# Patient Record
Sex: FEMALE | Race: ASIAN | NOT HISPANIC OR LATINO | URBAN - METROPOLITAN AREA
[De-identification: names, ages, dates, MRNs, and addresses within clinical notes are randomized per-mention and may not be internally consistent; named-entity substitution may affect disease eponyms.]

---

## 2019-03-28 ENCOUNTER — EMERGENCY (EMERGENCY)
Facility: HOSPITAL | Age: 84
LOS: 1 days | Discharge: HOME | End: 2019-03-28
Attending: EMERGENCY MEDICINE | Admitting: EMERGENCY MEDICINE
Payer: MEDICAID

## 2019-03-28 VITALS — SYSTOLIC BLOOD PRESSURE: 141 MMHG | RESPIRATION RATE: 16 BRPM | DIASTOLIC BLOOD PRESSURE: 104 MMHG

## 2019-03-28 VITALS
OXYGEN SATURATION: 100 % | HEART RATE: 89 BPM | SYSTOLIC BLOOD PRESSURE: 125 MMHG | TEMPERATURE: 98 F | DIASTOLIC BLOOD PRESSURE: 77 MMHG | RESPIRATION RATE: 18 BRPM

## 2019-03-28 LAB
ALBUMIN SERPL ELPH-MCNC: 4.2 G/DL — SIGNIFICANT CHANGE UP (ref 3.3–5)
ALP SERPL-CCNC: 85 U/L — SIGNIFICANT CHANGE UP (ref 40–120)
ALT FLD-CCNC: 12 U/L — SIGNIFICANT CHANGE UP (ref 4–33)
ANION GAP SERPL CALC-SCNC: 14 MMO/L — SIGNIFICANT CHANGE UP (ref 7–14)
APPEARANCE UR: CLEAR — SIGNIFICANT CHANGE UP
AST SERPL-CCNC: 25 U/L — SIGNIFICANT CHANGE UP (ref 4–32)
B PERT DNA SPEC QL NAA+PROBE: NOT DETECTED — SIGNIFICANT CHANGE UP
BACTERIA # UR AUTO: HIGH
BASOPHILS # BLD AUTO: 0.05 K/UL — SIGNIFICANT CHANGE UP (ref 0–0.2)
BASOPHILS NFR BLD AUTO: 0.4 % — SIGNIFICANT CHANGE UP (ref 0–2)
BILIRUB SERPL-MCNC: 0.2 MG/DL — SIGNIFICANT CHANGE UP (ref 0.2–1.2)
BILIRUB UR-MCNC: NEGATIVE — SIGNIFICANT CHANGE UP
BLOOD UR QL VISUAL: NEGATIVE — SIGNIFICANT CHANGE UP
BUN SERPL-MCNC: 39 MG/DL — HIGH (ref 7–23)
C PNEUM DNA SPEC QL NAA+PROBE: NOT DETECTED — SIGNIFICANT CHANGE UP
CALCIUM SERPL-MCNC: 9.3 MG/DL — SIGNIFICANT CHANGE UP (ref 8.4–10.5)
CHLORIDE SERPL-SCNC: 105 MMOL/L — SIGNIFICANT CHANGE UP (ref 98–107)
CO2 SERPL-SCNC: 23 MMOL/L — SIGNIFICANT CHANGE UP (ref 22–31)
COLOR SPEC: YELLOW — SIGNIFICANT CHANGE UP
CREAT SERPL-MCNC: 1.33 MG/DL — HIGH (ref 0.5–1.3)
EOSINOPHIL # BLD AUTO: 0.18 K/UL — SIGNIFICANT CHANGE UP (ref 0–0.5)
EOSINOPHIL NFR BLD AUTO: 1.3 % — SIGNIFICANT CHANGE UP (ref 0–6)
FLUAV H1 2009 PAND RNA SPEC QL NAA+PROBE: NOT DETECTED — SIGNIFICANT CHANGE UP
FLUAV H1 RNA SPEC QL NAA+PROBE: NOT DETECTED — SIGNIFICANT CHANGE UP
FLUAV H3 RNA SPEC QL NAA+PROBE: NOT DETECTED — SIGNIFICANT CHANGE UP
FLUAV SUBTYP SPEC NAA+PROBE: NOT DETECTED — SIGNIFICANT CHANGE UP
FLUBV RNA SPEC QL NAA+PROBE: NOT DETECTED — SIGNIFICANT CHANGE UP
GLUCOSE SERPL-MCNC: 92 MG/DL — SIGNIFICANT CHANGE UP (ref 70–99)
GLUCOSE UR-MCNC: NEGATIVE — SIGNIFICANT CHANGE UP
HADV DNA SPEC QL NAA+PROBE: NOT DETECTED — SIGNIFICANT CHANGE UP
HCOV PNL SPEC NAA+PROBE: SIGNIFICANT CHANGE UP
HCT VFR BLD CALC: 36.6 % — SIGNIFICANT CHANGE UP (ref 34.5–45)
HGB BLD-MCNC: 11.3 G/DL — LOW (ref 11.5–15.5)
HMPV RNA SPEC QL NAA+PROBE: NOT DETECTED — SIGNIFICANT CHANGE UP
HPIV1 RNA SPEC QL NAA+PROBE: NOT DETECTED — SIGNIFICANT CHANGE UP
HPIV2 RNA SPEC QL NAA+PROBE: NOT DETECTED — SIGNIFICANT CHANGE UP
HPIV3 RNA SPEC QL NAA+PROBE: NOT DETECTED — SIGNIFICANT CHANGE UP
HPIV4 RNA SPEC QL NAA+PROBE: NOT DETECTED — SIGNIFICANT CHANGE UP
HYALINE CASTS # UR AUTO: NEGATIVE — SIGNIFICANT CHANGE UP
IMM GRANULOCYTES NFR BLD AUTO: 0.3 % — SIGNIFICANT CHANGE UP (ref 0–1.5)
KETONES UR-MCNC: NEGATIVE — SIGNIFICANT CHANGE UP
LEUKOCYTE ESTERASE UR-ACNC: SIGNIFICANT CHANGE UP
LYMPHOCYTES # BLD AUTO: 37.2 % — SIGNIFICANT CHANGE UP (ref 13–44)
LYMPHOCYTES # BLD AUTO: 5.02 K/UL — HIGH (ref 1–3.3)
MAGNESIUM SERPL-MCNC: 2.4 MG/DL — SIGNIFICANT CHANGE UP (ref 1.6–2.6)
MCHC RBC-ENTMCNC: 28.2 PG — SIGNIFICANT CHANGE UP (ref 27–34)
MCHC RBC-ENTMCNC: 30.9 % — LOW (ref 32–36)
MCV RBC AUTO: 91.3 FL — SIGNIFICANT CHANGE UP (ref 80–100)
MONOCYTES # BLD AUTO: 1.15 K/UL — HIGH (ref 0–0.9)
MONOCYTES NFR BLD AUTO: 8.5 % — SIGNIFICANT CHANGE UP (ref 2–14)
NEUTROPHILS # BLD AUTO: 7.05 K/UL — SIGNIFICANT CHANGE UP (ref 1.8–7.4)
NEUTROPHILS NFR BLD AUTO: 52.3 % — SIGNIFICANT CHANGE UP (ref 43–77)
NITRITE UR-MCNC: POSITIVE — HIGH
NRBC # FLD: 0 K/UL — SIGNIFICANT CHANGE UP (ref 0–0)
PH UR: 6 — SIGNIFICANT CHANGE UP (ref 5–8)
PHOSPHATE SERPL-MCNC: 4.2 MG/DL — SIGNIFICANT CHANGE UP (ref 2.5–4.5)
PLATELET # BLD AUTO: 345 K/UL — SIGNIFICANT CHANGE UP (ref 150–400)
PMV BLD: 12 FL — SIGNIFICANT CHANGE UP (ref 7–13)
POTASSIUM SERPL-MCNC: 5.4 MMOL/L — HIGH (ref 3.5–5.3)
POTASSIUM SERPL-SCNC: 5.4 MMOL/L — HIGH (ref 3.5–5.3)
PROT SERPL-MCNC: 8.7 G/DL — HIGH (ref 6–8.3)
PROT UR-MCNC: 20 — SIGNIFICANT CHANGE UP
RBC # BLD: 4.01 M/UL — SIGNIFICANT CHANGE UP (ref 3.8–5.2)
RBC # FLD: 14.1 % — SIGNIFICANT CHANGE UP (ref 10.3–14.5)
RBC CASTS # UR COMP ASSIST: SIGNIFICANT CHANGE UP (ref 0–?)
RSV RNA SPEC QL NAA+PROBE: NOT DETECTED — SIGNIFICANT CHANGE UP
RV+EV RNA SPEC QL NAA+PROBE: NOT DETECTED — SIGNIFICANT CHANGE UP
SODIUM SERPL-SCNC: 142 MMOL/L — SIGNIFICANT CHANGE UP (ref 135–145)
SP GR SPEC: 1.02 — SIGNIFICANT CHANGE UP (ref 1–1.04)
SQUAMOUS # UR AUTO: SIGNIFICANT CHANGE UP
UROBILINOGEN FLD QL: NORMAL — SIGNIFICANT CHANGE UP
WBC # BLD: 13.49 K/UL — HIGH (ref 3.8–10.5)
WBC # FLD AUTO: 13.49 K/UL — HIGH (ref 3.8–10.5)
WBC UR QL: HIGH (ref 0–?)

## 2019-03-28 PROCEDURE — 71045 X-RAY EXAM CHEST 1 VIEW: CPT | Mod: 26

## 2019-03-28 PROCEDURE — 99285 EMERGENCY DEPT VISIT HI MDM: CPT | Mod: 25

## 2019-03-28 RX ORDER — HALOPERIDOL DECANOATE 100 MG/ML
2.5 INJECTION INTRAMUSCULAR ONCE
Qty: 0 | Refills: 0 | Status: COMPLETED | OUTPATIENT
Start: 2019-03-28 | End: 2019-03-28

## 2019-03-28 RX ORDER — CEFTRIAXONE 500 MG/1
1000 INJECTION, POWDER, FOR SOLUTION INTRAMUSCULAR; INTRAVENOUS ONCE
Qty: 0 | Refills: 0 | Status: COMPLETED | OUTPATIENT
Start: 2019-03-28 | End: 2019-03-28

## 2019-03-28 RX ORDER — CEPHALEXIN 500 MG
500 CAPSULE ORAL ONCE
Qty: 0 | Refills: 0 | Status: COMPLETED | OUTPATIENT
Start: 2019-03-28 | End: 2019-03-28

## 2019-03-28 RX ADMIN — HALOPERIDOL DECANOATE 2.5 MILLIGRAM(S): 100 INJECTION INTRAMUSCULAR at 07:19

## 2019-03-28 RX ADMIN — Medication 1 MILLIGRAM(S): at 10:40

## 2019-03-28 RX ADMIN — CEFTRIAXONE 1000 MILLIGRAM(S): 500 INJECTION, POWDER, FOR SOLUTION INTRAMUSCULAR; INTRAVENOUS at 14:59

## 2019-03-28 NOTE — ED ADULT NURSE REASSESSMENT NOTE - NS ED NURSE REASSESS COMMENT FT1
1000: Pt less agitated if no interventions are being performed. Sitting up in bed. Becomes agitated when staff comes near, not verbally redirectable despite multiple attempts by multiple staff..    1045: Pt medicated as per MD.     11:15: Pt mildly agitated during interventions, straight cath for urine as per MD orders. RVP sent. Will continue to monitor.

## 2019-03-28 NOTE — ED PROVIDER NOTE - PROGRESS NOTE DETAILS
Elizabeth Goldberger PGY-2: attyuliya Ortega called nursing home, awaiting callback from attg Spoke to Dr. Gomez Gonsalez at the pt's nursing home who states that there has been no change in the pt's mental status lately (baseline is agitated & not oriented). She was only sent in for elevated K, which is 5.4 in the ED--outside sample most likely hemolyzed. Dr. Gonsalez is ok with the pt coming back once urinalysis is checked. Pt has UTI. Spoke to Dr. Gonsalez, the pt's nursing home doctor. Will give her 500 keflex here and Dr. Gonsalez will complete the rest of the course after discharge. Just prior to ambulance , the pt crawled out of bed onto her hands an knees. Was not a fall. No head trauma. Pt reporting no pain. No external signs of trauma or bony tenderness in any extremities.

## 2019-03-28 NOTE — ED ADULT NURSE REASSESSMENT NOTE - NS ED NURSE REASSESS COMMENT FT1
EKG completed by PCA, linen change provided. Pt made more comfortable, pillows provided. Skin on back is intact. Small spots of redness observed to R arm. Family now at bedside. Currently NSR on cardiac monitor. Will continue to monitor.

## 2019-03-28 NOTE — ED PROVIDER NOTE - CLINICAL SUMMARY MEDICAL DECISION MAKING FREE TEXT BOX
86f w hx dementia, bipolar disorder, hypothyroidism [facility] incl hypernatremia, hyperkalemia and elevated crt. Pt currently awake but agitated and non-verbal, similar to baseline per paperwork. Will check rpt labs, hydrate, d/w NH reassess

## 2019-03-28 NOTE — PROVIDER CONTACT NOTE (OTHER) - ASSESSMENT
pt stable to return to [facility], [place], operated by [facility].   [facility] called and transportation set up with senior [facility].  trip#[id number], conf#

## 2019-03-28 NOTE — ED PROVIDER NOTE - ATTENDING CONTRIBUTION TO CARE
pt presenting from NH with reports of increased agitation and an elevated K and Na.  Pt is non verbal at baseline and is unable to provide any history    Gen: Chronically ill appearing in NAD  Head: NC/AT  Neck: trachea midline  Resp:  No distress CTAB  CV: RRR  Ext: no deformities  Neuro:  Alert appears non focal  Skin:  Warm and dry as visualized    Pt from NH with reported electrolyte abnormalities along with AMS.  Will recheck K as well as do infectious eval with CxR and UA.  Depending on results will discuss with NH MD and disposition appropriately

## 2019-03-28 NOTE — ED PROVIDER NOTE - NEUROLOGICAL, MLM
Ventral hernia without obstruction or gangrene  04/30/2018    Active  Fernanda Concepcion Alert, no focal deficits

## 2019-03-28 NOTE — ED ADULT NURSE REASSESSMENT NOTE - NS ED NURSE REASSESS COMMENT FT1
break cvg rn: patient resting in stretcher, combative with staff for all nursing interventions. medicated as per orders, on attempt to turn and reposition patient, patient began swinging/scrathing/attempting to bite staff members. able to be redirected. will ctm and maintain safety, pt is pending transport back to nursing home.
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
## 2019-03-28 NOTE — ED ADULT NURSE NOTE - OBJECTIVE STATEMENT
pt received in room 18, sent in from nursing home for high potassium & agitation. Pt is unwilling to cooperate with exam, attempting to hit, bite, kick and throw objects at staff. According to paperwork, pt was acting out at nursing home when staff attempted to give her medications to lower her potassium. 20G IV placed to R forearm. Labs drawn & sent. Will continue to monitor.

## 2019-03-28 NOTE — ED ADULT NURSE REASSESSMENT NOTE - NS ED NURSE REASSESS COMMENT FT1
1822: [facility] at bedside for  to nursing home. Pt in NAD. sitting up in bed, awake. Family at bedside prior to transfer. Family notified of pts incident.
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
## 2019-03-28 NOTE — ED PROVIDER NOTE - OBJECTIVE STATEMENT
86f w hx dementia, bipolar disorder, hypothyroidism sent in from [facility] for abnl labs - Na 149, K 6, crt 1.4. Pt herself currently awake and alert but not speaking. Hx per NH paperwork states attempted to give pt Kayexalate and IV hypotonic saline; some Kayexalate given but only sm amt, and pt pulled out iv immediately, so decision was made to sent to ED.

## 2019-03-28 NOTE — ED ADULT NURSE NOTE - NSIMPLEMENTINTERV_GEN_ALL_ED
Implemented All Fall Risk Interventions:  [place] to call system. Call bell, personal items and telephone within reach. Instruct patient to call for assistance. Room bathroom lighting operational. Non-slip footwear when patient is off stretcher. Physically safe environment: no spills, clutter or unnecessary equipment. Stretcher in lowest position, wheels locked, appropriate side rails in place. Provide visual cue, wrist band, yellow gown, etc. Monitor gait and stability. Monitor for mental status changes and reorient to person, place, and time. Review medications for side effects contributing to fall risk. Reinforce activity limits and safety measures with patient and family.

## 2019-03-28 NOTE — ED ADULT NURSE REASSESSMENT NOTE - NS ED NURSE REASSESS COMMENT FT1
1814: Pt trying to climb out of bed, witnessed coming down to floor on hands and knees. Unable to reach pt in time. Did not hit head. Awake/alert at all times. MD immediately at bedside to perform evaluation. No visible injuries observed.     Assessed by MD Ugalde at bedside. ED att Antionette made aware/notified.

## 2019-03-29 LAB — SPECIMEN SOURCE: SIGNIFICANT CHANGE UP

## 2019-03-30 LAB
-  AMIKACIN: SIGNIFICANT CHANGE UP
-  AMPICILLIN/SULBACTAM: SIGNIFICANT CHANGE UP
-  AMPICILLIN: SIGNIFICANT CHANGE UP
-  AZTREONAM: SIGNIFICANT CHANGE UP
-  CEFAZOLIN: SIGNIFICANT CHANGE UP
-  CEFEPIME: SIGNIFICANT CHANGE UP
-  CEFOXITIN: SIGNIFICANT CHANGE UP
-  CEFTAZIDIME: SIGNIFICANT CHANGE UP
-  CEFTRIAXONE: SIGNIFICANT CHANGE UP
-  CIPROFLOXACIN: SIGNIFICANT CHANGE UP
-  ERTAPENEM: SIGNIFICANT CHANGE UP
-  GENTAMICIN: SIGNIFICANT CHANGE UP
-  IMIPENEM: SIGNIFICANT CHANGE UP
-  LEVOFLOXACIN: SIGNIFICANT CHANGE UP
-  MEROPENEM: SIGNIFICANT CHANGE UP
-  NITROFURANTOIN: SIGNIFICANT CHANGE UP
-  PIPERACILLIN/TAZOBACTAM: SIGNIFICANT CHANGE UP
-  TIGECYCLINE: SIGNIFICANT CHANGE UP
-  TOBRAMYCIN: SIGNIFICANT CHANGE UP
-  TRIMETHOPRIM/SULFAMETHOXAZOLE: SIGNIFICANT CHANGE UP
BACTERIA UR CULT: SIGNIFICANT CHANGE UP
METHOD TYPE: SIGNIFICANT CHANGE UP
ORGANISM # SPEC MICROSCOPIC CNT: SIGNIFICANT CHANGE UP
ORGANISM # SPEC MICROSCOPIC CNT: SIGNIFICANT CHANGE UP
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
## 2022-05-18 ENCOUNTER — EMERGENCY (EMERGENCY)
Facility: HOSPITAL | Age: 87
LOS: 1 days | Discharge: HOME | End: 2022-05-18
Attending: EMERGENCY MEDICINE | Admitting: EMERGENCY MEDICINE
Payer: MEDICAID

## 2022-05-18 VITALS
OXYGEN SATURATION: 98 % | RESPIRATION RATE: 16 BRPM | HEART RATE: 98 BPM | TEMPERATURE: 99 F | SYSTOLIC BLOOD PRESSURE: 141 MMHG | DIASTOLIC BLOOD PRESSURE: 89 MMHG

## 2022-05-18 VITALS
TEMPERATURE: 98 F | RESPIRATION RATE: 18 BRPM | OXYGEN SATURATION: 97 % | HEART RATE: 99 BPM | SYSTOLIC BLOOD PRESSURE: 144 MMHG | DIASTOLIC BLOOD PRESSURE: 82 MMHG

## 2022-05-18 PROBLEM — F03.90 UNSPECIFIED DEMENTIA, UNSPECIFIED SEVERITY, WITHOUT BEHAVIORAL DISTURBANCE, PSYCHOTIC DISTURBANCE, MOOD DISTURBANCE, AND ANXIETY: Chronic | Status: ACTIVE | Noted: 2019-03-28

## 2022-05-18 PROBLEM — F03.90 UNSPECIFIED DEMENTIA WITHOUT BEHAVIORAL DISTURBANCE: Chronic | Status: ACTIVE | Noted: 2019-03-28

## 2022-05-18 PROCEDURE — 99284 EMERGENCY DEPT VISIT MOD MDM: CPT

## 2022-05-18 NOTE — ED PROVIDER NOTE - PATIENT PORTAL LINK FT
You can access the FollowMyHealth Patient Portal offered by [facility] by registering at the following website: [email or web address]. By joining [employer]’s FollowMyHealth portal, you will also be able to view your health information using other applications (apps) compatible with our system.

## 2022-05-18 NOTE — ED PROVIDER NOTE - PROGRESS NOTE DETAILS
AVI BULL: spoke with pt's daughter (Guardian) and  Dr. Cooper on phone, regarding pt's left hip swelling, atraumatic. discussed risks & benefits of obtain Xray hip. Informed that patient is not surgical candidate even if there's fracture; and risks of using sedation. Attending also spoke with pt's daughter & daughter's  regarding patient's condition, exam finding, and risks & benefits of Xray and sedation.

## 2022-05-18 NOTE — ED ADULT TRIAGE NOTE - CHIEF COMPLAINT QUOTE
Pt BIBA from [facility] for swelling in Left hip. pt is sitting cross-legged, does not appear to be in pain, is non-verbal at baseline

## 2022-05-18 NOTE — ED PROVIDER NOTE - CLINICAL SUMMARY MEDICAL DECISION MAKING FREE TEXT BOX
88 yo F with PMH of dementia, hypothyroid, depression, osteoarthritis, [facility]o left hip swelling today. Pt non-verbal. No hx of injury/trauma. There is no fever. low suspicion for fracture/dislocation. low suspicion for infectious etiology. Discussed with Dr. Shearer, agrees that patient is bedbound, even if patient has fracture, pt is non-surgical candidate and not candidate for X-ray (pt doesn't follow command, in fetal position). Plan: discussed with family regarding risk & benefits of obtain Xray for further evaluation.

## 2022-05-18 NOTE — PROVIDER CONTACT NOTE (OTHER) - ASSESSMENT
Arranged [facility] [phone]. Trip# [id number]. P/U 8 PM. Pt returning back to [facility] Auth#  Inv# [id number]

## 2022-05-18 NOTE — ED PROVIDER NOTE - NS ED ATTENDING STATEMENT MOD
This was a shared visit with the ERASMO. I reviewed and verified the documentation and independently performed the documented:

## 2022-05-18 NOTE — ED PROVIDER NOTE - EXTREMITY EXAM
patient in fetal position, flexed at hip & knees. Hip: no erythema, no edema, no ecchymosis, no bony tenderness, no swelling, skin intact. Left lower leg: dark discoloration (chronic skin changes); patient is not in pain during exam.

## 2022-05-18 NOTE — ED PROVIDER NOTE - ATTENDING APP SHARED VISIT CONTRIBUTION OF CARE
Attending Statement: I have reviewed and agree with all pertinent clinical information, including history and physical exam and agree with treatment plan of the PA, except as noted.  88 yo F with PMH of dementia, hypothyroid, depression, osteoarthritis BIBEMS from NH for left hip swelling today per NH forms. pt unable to give any hx. pt initially sitting up with both leg flexed, able to undress her with PA Aman, pt laying down with both hips flexed, resting comfortably no moaning or groaning. no grimace or yelling when move her, able to turn pt to left/right for exam. no distress. Doesn't appear in pain. Looks around, non verbal. no sign of head/facial trauma. no retractions. poor inspiratory effort w no WOB. soft nondistended abdomen, no grimace to palpation. no leg swelling of lower ext or bl hips. no erythema no warmth to touch. no rash. superficial scratch on the left thigh, no bleeding. no vesicles. no leg edema. PVD bl lower ext   DW Dr Barton, PMD, and family : sister and :  latosha no signs for septic joint: doesn't appear to be in pain. pt is not ambulatory. Risks/benefits dw pt family and PMD, dc back to NH. no labs/xr indicated.

## 2022-05-18 NOTE — ED PROVIDER NOTE - OBJECTIVE STATEMENT
89 yo F with PMH of dementia, hypothyroid, depression, osteoarthritis, sent from [facility] to ED c/o left hip swelling today. Pt non-verbal at baseline. Spoke with Dr. Shearer, states patient has swelling to left hip, but no fall, no injury/trauma, been waiting 48 hrs for X-ray to evaluate fracture. Admits patient is bedbound, non-ambulatory.

## 2022-05-18 NOTE — ED ADULT NURSE NOTE - CHIEF COMPLAINT QUOTE
Pt BIBA from [facility] for swelling in Left hip. pt is sitting cross-legged, does not appear to be in pain, is non-verbal at baseline

## 2022-10-10 NOTE — ED PROVIDER NOTE - CPE EDP RESP NORM
normal... Cephalexin Pregnancy And Lactation Text: This medication is Pregnancy Category B and considered safe during pregnancy.  It is also excreted in breast milk but can be used safely for shorter doses.

## 2022-11-10 ENCOUNTER — EMERGENCY (EMERGENCY)
Facility: HOSPITAL | Age: 87
LOS: 1 days | Discharge: HOME | End: 2022-11-10
Attending: EMERGENCY MEDICINE | Admitting: EMERGENCY MEDICINE

## 2022-11-10 VITALS
OXYGEN SATURATION: 98 % | HEART RATE: 113 BPM | SYSTOLIC BLOOD PRESSURE: 139 MMHG | RESPIRATION RATE: 18 BRPM | DIASTOLIC BLOOD PRESSURE: 86 MMHG | TEMPERATURE: 98 F

## 2022-11-10 PROBLEM — M19.90 UNSPECIFIED OSTEOARTHRITIS, UNSPECIFIED SITE: Chronic | Status: ACTIVE | Noted: 2022-05-18

## 2022-11-10 PROBLEM — F32.9 MAJOR DEPRESSIVE DISORDER, SINGLE EPISODE, UNSPECIFIED: Chronic | Status: ACTIVE | Noted: 2022-05-18

## 2022-11-10 PROBLEM — E03.9 HYPOTHYROIDISM, UNSPECIFIED: Chronic | Status: ACTIVE | Noted: 2022-05-18

## 2022-11-10 PROCEDURE — 72125 CT NECK SPINE W/O DYE: CPT | Mod: 26,MG

## 2022-11-10 PROCEDURE — 99285 EMERGENCY DEPT VISIT HI MDM: CPT

## 2022-11-10 PROCEDURE — G1004: CPT

## 2022-11-10 PROCEDURE — 70450 CT HEAD/BRAIN W/O DYE: CPT | Mod: 26,MA

## 2022-11-10 PROCEDURE — 72170 X-RAY EXAM OF PELVIS: CPT | Mod: 26

## 2022-11-10 RX ADMIN — Medication 1 MILLIGRAM(S): at 20:39

## 2022-11-10 NOTE — ED PROVIDER NOTE - OBJECTIVE STATEMENT
89 yof w/ dementia, hypothyroidiism, DNR DNI from [facility] w/ bump of back of head. Sent in for eval. Story states from NH that she was found w/ bump on head, not found on floor, unclear if LOC or hit head at all. On ASA at home. No fevers noted in chart. No other c/c. Baseline is aox1, to self

## 2022-11-10 NOTE — ED ADULT NURSE NOTE - OBJECTIVE STATEMENT
Pt received rm 12, by EMS sent from [facility], [place], operated by [facility] for a bump/brusie found on the top of head. pmhx dementia, major depression. Unable to asses orientation status, pt minimally verbal. Pt is awake and alert in bed. Pt not following commands for neuro/physically assessment.  Pt states she "bumped her head." Pt is poor historian on events that happened. Pt uses nonverbal indicators of pain, nodding of head, and pointing to where it hurts. Breathing even, unlabored. Pulses equal bilaterally, cap refill < 3secs. PERRLA. Sinus tach on monitor, MD aware. Vitals are stable, pt afebrile. Pending CT.

## 2022-11-10 NOTE — ED PROVIDER NOTE - NSFOLLOWUPINSTRUCTIONS_ED_ALL_ED_FT
--take tylenol or motrin as needed for pain. Take tylenol 1000mg every 6 hours alternating with motrin 600 mg every 6 hours (take tylenol or motrin then three hours later take the other then three hours later take the first).  --today, the imaging tests we did include ct head and ct c spine, xray pelvis. results significant for no fracture. we have included these test results in your paperwork. please take them to your follow-up appointment  --given that you were in the ED today, we recommend a followup visit with your general doctor (primary care doctor) within 7 days.   --your diagnosis is: traumatic hematoma of the head, initial encounter.   --return to the ED if your current symptoms worsen, if confused, if vision changes, if vomiting.

## 2022-11-10 NOTE — ED PROVIDER NOTE - PROGRESS NOTE DETAILS
Eder Venegas, PGY-3- Spoke with Zoe Ellsworth, per Zoe Ellsworth she did not have a fall and was found to have hematoma on scalp. Pt is confused at baseline. Pt sits in bed daily. Pt sitting comfortable in bed. Waiting on 2nd try for CT; first CT - pt agitated, will attempt w/ ativan premedicated Alyssa Fuentes MD, PGY-3: pt not tolerating chest xray given agitation. pt already given multiple doses of ativan for agitation/to tolerate imaging. given low concern for PTX or rib fracture, pt satting well on RA, not tachypneic, will dc w/o imaging.

## 2022-11-10 NOTE — ED ADULT NURSE NOTE - CHIEF COMPLAINT QUOTE
p/t with hx dementia sent from NH for eval, new onset of bruise to lt arm and hematoma to lt upper head, p/t non verbal @ baseline, awake and calm  @ present. no blood thinner use

## 2022-11-10 NOTE — ED PROVIDER NOTE - PHYSICAL EXAMINATION
Exam:   General, sitting in bed. Per paperwork baseline is AOx1, pt awake tracking me asking for water.    Skin: Skin not noted to be bruised on arm or limbs. Appreciated dark discoloration of skin on limbs and torso, rough to touch.    HEENT: No rhinorhea, no conjuctival injection,, no battles sign noted, no rhinorehea noted, no perorbital ecchymosis. Crown of head noted to have 3 cm by 3 cm raised area, no brusing noted. Some flucuance noted    CHEST: RRR, no murmurs   Pulm: No crackles or diminshed BS b/l. No ttp on anterior and lateral ribcage. No ttp when palp limbs  GI: no ttp in any quadrant, no noted periflank hemmorhage noted Exam:   General, sitting in bed. Per paperwork baseline is AOx1, pt awake tracking me asking for water.    Skin: Skin not noted to be bruised on arm or limbs. Appreciated dark discoloration of skin on limbs and torso, rough to touch.    HEENT: No rhinorhea, no conjuctival injection,, no battles sign noted, no rhinorehea noted, no perorbital ecchymosis. Crown of head noted to have 3 cm by 3 cm raised area, no brusing noted. Some flucuance noted    CHEST: RRR, no murmurs   Pulm: No crackles or diminshed BS b/l. No ttp on anterior and lateral ribcage. No ttp when palp limbs  GI: no ttp in any quadrant, no noted periflank hemmorhage noted    PSYCH:  Calm cooperative  NEURO:  Moving all ext

## 2022-11-10 NOTE — ED PROVIDER NOTE - ATTENDING CONTRIBUTION TO CARE
DR. THOMAS, ATTENDING MD-  I performed a face to face bedside interview with the patient regarding history of present illness, review of symptoms and past medical history. I completed an independent physical exam.  I have discussed the patient's plan of care with the resident.   Documentation as above in the note.    89-year-old female history of dementia here for evaluation of hematoma to occiput.  No witnessed fall.  Not on blood thinners.  Patient at baseline mental status per facility.  Obtain CT head C-spine chest x-ray pelvis x-ray likely discharge with PCP follow-up as outpatient.

## 2022-11-10 NOTE — ED PROVIDER NOTE - CLINICAL SUMMARY MEDICAL DECISION MAKING FREE TEXT BOX
a/p: 89 yof dementia, hypothyroidism, DNR DNI p/w bump on back of head on ASA, no other thinners.  c/f SAH vs EDH vs SDH w/ low liklihood given she did not necisarily fall, but story unclear  Will image pt, will give anxiolytic given her apprehensiion in ed

## 2022-11-10 NOTE — ED ADULT NURSE NOTE - NSIMPLEMENTINTERV_GEN_ALL_ED
Implemented All Fall with Harm Risk Interventions:  [place] to call system. Call bell, personal items and telephone within reach. Instruct patient to call for assistance. Room bathroom lighting operational. Non-slip footwear when patient is off stretcher. Physically safe environment: no spills, clutter or unnecessary equipment. Stretcher in lowest position, wheels locked, appropriate side rails in place. Provide visual cue, wrist band, yellow gown, etc. Monitor gait and stability. Monitor for mental status changes and reorient to person, place, and time. Review medications for side effects contributing to fall risk. Reinforce activity limits and safety measures with patient and family. Provide visual clues: red socks.

## 2022-11-10 NOTE — ED PROVIDER NOTE - NS ED ROS FT
Yes
from NH staff:    Constitutional: (-) chills, (-) lethargy  Eyes (-) visual changes  ENMT:. (-) neck pain or stiffness  Cardiac: (-) chest pain   Respiratory:  (-) cough   GI:  (-) nausea (-) vomiting   :  (-) dysuria  MS:  (-) back pain  Neuro:  (-) headache   Skin:  (-) rash  Except as documented in the HPI,  all other systems are negative

## 2022-11-10 NOTE — ED ADULT NURSE REASSESSMENT NOTE - NS ED NURSE REASSESS COMMENT FT1
received report from larry BONNER. Pt is a/o x 0 not answering any questions. .pt medicated for CT scan. Awaiting further orders. Will continue to monitor.
Pt resting comfortably in stretcher. No change to neuro status. Pt nonverbal at this time. Pt was unable to cooperate at CT, refused to lay down. Pt pending medication as per MAR, and CT. Fall precautions in place. Will continue to monitor.

## 2022-11-10 NOTE — ED ADULT TRIAGE NOTE - CHIEF COMPLAINT QUOTE
p/t with hx dementia sent from NH for eval, new onset of bruise to lt arm and hematoma to lt upper head, p/t non verbal @ baseline, awake and calm  @ present p/t with hx dementia sent from NH for eval, new onset of bruise to lt arm and hematoma to lt upper head, p/t non verbal @ baseline, awake and calm  @ present. no blood thinner use

## 2022-11-10 NOTE — ED PROVIDER NOTE - PATIENT PORTAL LINK FT
You can access the FollowMyHealth Patient Portal offered by [facility] by registering at the following website: [email or web address]. By joining [employer]’s FollowMyHealth portal, you will also be able to view your health information using other applications (apps) compatible with our system.

## 2022-11-11 VITALS
RESPIRATION RATE: 16 BRPM | SYSTOLIC BLOOD PRESSURE: 145 MMHG | HEART RATE: 99 BPM | DIASTOLIC BLOOD PRESSURE: 97 MMHG | OXYGEN SATURATION: 100 % | TEMPERATURE: 98 F

## 2022-11-11 NOTE — PROVIDER CONTACT NOTE (OTHER) - BACKGROUND
Patient resides at [facility] ([phone]).  Contacted residence, spoke with staff member Rudi.  Rudi confirms that patient is a resident and that she travels via ambulance.

## 2022-11-11 NOTE — PROVIDER CONTACT NOTE (OTHER) - SITUATION
Notified by Notified by provider that patient is ready for discharge and requires and transport back to facility.

## 2022-11-11 NOTE — PROVIDER CONTACT NOTE (OTHER) - ACTION/TREATMENT ORDERED:
[facility] contacted ([phone]), spoke with Sari.  Ambulance transport set up, ETA 4AM.

## 2023-03-12 NOTE — ED ADULT NURSE NOTE - NSSEPSISSUSPECTED_ED_A_ED
[facility] Hospitalist Progress Note      Subjective  Patient seen and examined. No new complaints. No acute events overnight Pain is well controlled. No chest pain or SOB.  Has not worked with PT/OT yet.  Was living at home prior to this.  Will consult PT and ? If patient needs acute inpatient rehab.  No other complaints.  Vitals ok.    Labs showed K 5.4  Nephrology aware  Cr 6.39   lokelma ordered and one time dose of lasix ordered per nephrology   WBC trending down   Hg 7.7 today         Physical Exam    Vitals with min/max:    Vital Last Value 24 Hour Range   Temperature 98.6 °F (37 °C) (03/12/23 0727) Temp  Min: 97.7 °F (36.5 °C)  Max: 98.6 °F (37 °C)   Pulse 76 (03/12/23 0727) Pulse  Min: 64  Max: 86   Respiratory 16 (03/10/23 2232) No data recorded   Non-Invasive  Blood Pressure 135/45 (03/12/23 0727) BP  Min: 120/62  Max: 146/64   Pulse Oximetry 96 % (03/12/23 0727) SpO2  Min: 93 %  Max: 96 %   Arterial   Blood Pressure 104/67 (03/01/23 1300) No data recorded      General: NAD   HEENT: NCAT   Respiratory: CTAB   Cardiovascular: RRR no murmurs   Gastrointestinal: Soft, NT, ND normal BS   Musculoskeletal: s/p R AKA and previous  L AKA   Neurology: alert and oriented to place, time, person.  No focal defect  Skin: Warm. No rashes noted   Psychiatry: Cooperative  Ext: No c/c/e     Current Medications:  Current Facility-Administered Medications   Medication Dose Route Frequency Provider Last Rate Last Admin   • sodium zirconium cyclosilicate (LOKELMA) packet 10 g  10 g Oral TID Bertin Estrada MD       • furosemide (LASIX INJECT) injection 100 mg  100 mg Intravenous Once Bertin Estrada MD       • [START ON 3/13/2023] sodium chloride (NORMAL SALINE) 0.9 % bolus 100-200 mL  100-200 mL Intravenous PRN Bertin Estrada MD       • albumin human (SPA) 25 % injection 25 g  25 g Intravenous BID PRN Bertin Estrada MD       • sodium chloride (NORMAL SALINE) 0.9 % bolus 100-200 mL  100-200 mL Intravenous PRN Bertin Estrada MD       •  oxyCODONE (IMM REL) (ROXICODONE) tablet 5 mg  5 mg Oral Once Lewis Van MD       • oxyCODONE (IMM REL) (ROXICODONE) tablet 10 mg  10 mg Oral Q4H PRN Lewis Van MD   10 mg at 03/10/23 2132   • alteplase (CATHFLO ACTIVASE) injection 2 mg  2 mg Intracatheter PRN Bertin Estrada MD   2 mg at 03/06/23 1213   • alteplase (CATHFLO ACTIVASE) injection 2 mg  2 mg Intracatheter PRN Bertin Estrada MD   2 mg at 03/06/23 1212   • sodium chloride 0.9% infusion   Intravenous Continuous PRN Lewis Van MD       • gabapentin (NEURONTIN) capsule 100 mg  100 mg Oral 3 times per day Blanca Clements MD   100 mg at 03/12/23 0540   • acetaminophen (TYLENOL) tablet 650 mg  650 mg Oral Q4H Blanca Clements MD   650 mg at 03/12/23 0540   • metoPROLOL tartrate (LOPRESSOR) tablet 12.5 mg  12.5 mg Oral 2 times per day Amy Wilburn CNP   12.5 mg at 03/11/23 2137   • epoetin lilliam-epbx (RETACRIT) 23132 UNIT/ML injection 10,000 Units  10,000 Units Subcutaneous Once per day on Mon Wed Fri Emmett Gutierrez MD   10,000 Units at 03/08/23 1701   • calcitRIOL (ROCALTROL) capsule 0.25 mcg  0.25 mcg Oral Daily Emmett Gutierrez MD   0.25 mcg at 03/11/23 0833   • sodium chloride 0.9 % flush bag 25 mL  25 mL Intravenous PRN Chance Nelson MD       • sodium chloride (PF) 0.9 % injection 2 mL  2 mL Intracatheter 2 times per day Chance Nelson MD   2 mL at 03/11/23 2147   • allopurinol (ZYLOPRIM) tablet 100 mg  100 mg Oral Once per day on Mon Wed Fri [facility]   100 mg at 03/10/23 0901   • atorvastatin (LIPITOR) tablet 80 mg  80 mg Oral Nightly [facility]   80 mg at 03/11/23 2136   • clopidogrel (PLAVIX) tablet 75 mg  75 mg Oral Daily [facility]   75 mg at 03/11/23 0832   • [Held by provider] cinacalcet (SENSIPAR) tablet 90 mg  90 mg Oral Daily [facility]   90 mg at 02/28/23 2056   • [Held by provider] sevelamer carbonate (RENVELA) tablet 2,400 mg  2,400 mg Oral TID [facility]       • aspirin (ECOTRIN) enteric coated tablet 81 mg  81 mg Oral Daily  Mitchell Kraft   81 mg at 03/11/23 0833   • heparin (porcine) injection 5,000 Units  5,000 Units Subcutaneous 3 times per day Mitchell Kraft   5,000 Units at 03/12/23 0540         Labs     No results displayed because visit has over 200 results.                   Assessment and Plan  PAD s/p L AKA/ Critical limb ischemia RLE   - Patient underwent right lower extremity angiogram/attempted revascularization for critical limb ischemia which was complicated by an expanding hematoma requiring emergent vascular surgery intervention on 2/28.  At that time he underwent SFA repair and hematoma evacuation.    - He is now POD 0 from R AKA which was done to help relieve his rest pain on 3/9/23   - Continue pain control as needed  - Continue asa and plavix  - PT OT  consult        Leukocytosis  - Afebrile  - No clear infectious source  - WBC now 16 from 14 - ? Reactive vs infectious.  Will check UA and CXR       ESRD on HD MWF   - Nephrology following  - resume meds  - HD        Hyperkalemia  - Nephrology aware, ordered lokelma and one dose of lasix   - Recheck K  - HD in am tomorrow     HFpEF  - Cardiology following  - Monitor Is and Os   - Continue BB   - Fluid management per HD      HTN  - Continue bblocker     HL  - statin     Acute blood loss anemia/ anemia of chronic disease due to ESRD   - h and h has been stable  - CTM- transfuse if < 7      Gout  - Continue allopurinol     - MGUS  Will need continued OP followup      Dispo: await PT/ OT recs regarding dc planning              DVT prophylaxis:. SCD,   Current Active Medications for DVT Prophylaxis (From admission, onward)         Stop     heparin (porcine) injection 5,000 Units  5,000 Units,   Subcutaneous,   3 times per day         --               Diet:  Dietary Orders (From admission, onward)     Start     Ordered    03/09/23 2470  Consistent Carb Moderate (45-75 gm/meal) Diet  DIET EFFECTIVE NOW        Question:  Diet Modifiers  Answer:  Consistent Carb Moderate  (45-75 gm/meal)    03/09/23 1751    03/03/23 1116  Nutrition Communication  CONTINUOUS        Question:  Nutrition communication (specify)  Answer:  please send grilled cheese for dinner tonight 3/3, thank you!    03/03/23 1118    03/02/23 1145  One Time Diet Cardiac, Renal (2400mg Na+, 60mEq K+, 1000mg P); please put jello and italian ice on lunch tray thank you  DIET ONE TIME EFFECTIVE 1030        Question Answer Comment   Diet Modifiers Cardiac    Diet Modifiers Renal (2400mg Na+, 60mEq K+, 1000mg P)    Comment please put jello and italian ice on lunch tray thank you        03/02/23 1145              Code status:  Code Status Information     Code Status    Full Resuscitation        Activity:   Active Orders   Supplies    MISC Non DME Supply Other (Specify); please send tele box stat to [facility] rm 26. thx     Frequency: 1 Time     Number of Occurrences: 1 Occurrences   Lab    Albumin     Frequency: AM Draw     Number of Occurrences: 1 Days    Basic Metabolic Panel     Frequency: AM Draw     Number of Occurrences: 4 Days    Basic Metabolic Panel     Frequency: AM Draw     Number of Occurrences: 1 Days    CBC No Differential     Frequency: AM Draw     Number of Occurrences: 4 Days    CBC No Differential     Frequency: AM Draw     Number of Occurrences: 1 Days    Hemoglobin     Frequency: If condition met     Number of Occurrences: 1 Occurrences     Order Comments: Release 60 minutes post transfusion        Magnesium     Frequency: AM Draw     Number of Occurrences: 1 Days    Phosphorus     Frequency: AM Draw     Number of Occurrences: 1 Days    Urinalysis & Reflex Microscopy With Culture If Indicated     Frequency: Once Routine     Number of Occurrences: 1 Occurrences   Code Status    Full Resuscitation     Frequency: Continuous     Number of Occurrences: Until Specified   Consult    Inpatient consult to Cardiology     Frequency: 1 Time     Number of Occurrences: 1 Occurrences    Inpatient consult to Hospitalist      Frequency: 1 Time     Number of Occurrences: 1 Occurrences   OT    Occupational Therapy     Frequency: Per Therapist     Number of Occurrences: 1 Occurrences    Occupational Therapy     Frequency: Per Therapist     Number of Occurrences: 1 Occurrences   PT    Physical Therapy     Frequency: Per Therapist     Number of Occurrences: 1 Occurrences    Physical Therapy     Frequency: Per Therapist     Number of Occurrences: 1 Occurrences   Respiratory Care    Oxygen Therapy Continuous greater than 90%     Frequency: Continuous     Number of Occurrences: Until Specified     Order Comments: A physician should be notified when the following steps are necessary to maintain an SpO2 of > 90%:   ; An oxygen flow rate increase of > 4 liters/min OR,   ; An FiO2 increase of 20% OR,   ; An FiO2 requirement of 90%     IV    Insert peripheral IV     Frequency: Continuous     Number of Occurrences: Until Specified   Admission    Admit to Inpatient     Frequency: 1 Time     Number of Occurrences: 1 Occurrences   Diet    Consistent Carb Moderate (45-75 gm/meal) Diet     Frequency: Effective Now     Number of Occurrences: Until Specified   Nursing    Activity     Frequency: Until Discontinued     Number of Occurrences: Until Specified    Bladder scan     Frequency: PRN     Number of Occurrences: Until Specified    Daily aspirin already ordered via med rec     Frequency: 1 Time     Number of Occurrences: 1 Occurrences    Discharge date to be determined     Frequency: 1 Time     Number of Occurrences: 1 Occurrences    Discontinue Intake and Output     Frequency: 1 Time     Number of Occurrences: 1 Occurrences     Order Comments: in AM if voiding      Discontinue previous heparin orders if not already done.     Frequency: 1 Time     Number of Occurrences: 1 Occurrences    Follow antiemetic sequence below for Phase 2 antiemetics, unless already given in the PACU (if prescribed):     Frequency: Until Discontinued     Number of Occurrences:  Until Specified     Order Comments: Follow antiemetic sequence below for Phase 2 antiemetics, unless already given in the PACU (if prescribed):   1. ondansetron (do not use if already given palonosetron)  2. Droperidol  3. prochlorperazine    4. diphenhydrAMINE    5. metoCLOPramide    6. palonsetron      Head of bed     Frequency: Until Discontinued     Number of Occurrences: Until Specified    Hemodialysis Configuration     Frequency: 1 Time     Number of Occurrences: 1 Occurrences    Hemodialysis Configuration     Frequency: 1 Time     Number of Occurrences: 1 Occurrences    If on metformin or NSAIDS including ELDRIDGE-2 Inhibitors (except aspirin) preprocedure Pharmacist to retime orders to resume 48 hours post cardiac cath procedure. Instruct patient prior to discharge to hold for 48 hours post cardiac cath procedure     Frequency: 1 Time     Number of Occurrences: 1 Occurrences    If on oral hypoglycemics, hold until eating post cardiac cath procedure     Frequency: 1 Time     Number of Occurrences: 1 Occurrences    Informed consent already obtained     Frequency: 1 Time     Number of Occurrences: 1 Occurrences     Order Comments: Informed consent from the patient/personal representative has already been obtained during this episode of illness.        Informed consent obtained     Frequency: 1 Time     Number of Occurrences: 1 Occurrences     Order Comments: Prior to the blood transfusion(s), I discussed risks, benefits and alternatives of receiving blood products with patient/representative. I have answered his/her questions and he/she agrees to blood/blood component transfusion.      Insert/Maintain urethral catheter with indication     Frequency: Until Discontinued     Number of Occurrences: Until Specified     Order Comments: PRN if unable to void while on bedrest      Intake and output     Frequency: Q Shift     Number of Occurrences: Until Specified    Intermittent urinary catheterization     Frequency: Until  Discontinued     Number of Occurrences: Until Specified     Order Comments: Every 4 hours PRN, after arrival on unit or removal of indwelling catheter, for inability to void or residual greater than 350 mL. If third straight cath is required notify physician of need for bladder management plan      Misc nursing order (specify)     Frequency: Until Discontinued     Number of Occurrences: Until Specified     Order Comments: Map greater than 60      Misc nursing order (specify)     Frequency: Until Discontinued     Number of Occurrences: Until Specified     Order Comments: Okay per nephro to put in Ivs in R Arm under graft      Misc nursing order (specify)     Frequency: Until Discontinued     Number of Occurrences: Until Specified     Order Comments: Ok for bp cuff over non working graft site on right arm.    No Blood draws, IVs or bp cuffs on left arm.      Notify: Assessments     Frequency: Until Discontinued     Number of Occurrences: Until Specified    Notify: Assessments     Frequency: Until Discontinued     Number of Occurrences: Until Specified    Notify: Assessments     Frequency: Until Discontinued     Number of Occurrences: Until Specified    Notify: Assessments     Frequency: Until Discontinued     Number of Occurrences: Until Specified    Notify: Labs     Frequency: Until Discontinued     Number of Occurrences: Until Specified    Notify: Medications     Frequency: Until Discontinued     Number of Occurrences: Until Specified    Notify: Metered blood glucose     Frequency: Until Discontinued     Number of Occurrences: Until Specified    Notify: per Routine Standards     Frequency: Per Routine Standard     Number of Occurrences: Until Specified    Notify: Treatment     Frequency: Until Discontinued     Number of Occurrences: Until Specified    Notify: Treatment     Frequency: Until Discontinued     Number of Occurrences: Until Specified    Notify: Treatment     Frequency: Until Discontinued     Number of  Occurrences: Until Specified    Notify: Treatment     Frequency: Until Discontinued     Number of Occurrences: Until Specified    Notify: Treatment     Frequency: Until Discontinued     Number of Occurrences: Until Specified    P2Y12 regimen post-procedure already ordered via med rec     Frequency: Until Discontinued     Number of Occurrences: Until Specified    Patient not requiring Inpatient Cardiac Rehab     Frequency: Until Discontinued     Number of Occurrences: Until Specified    Patient not requiring Service to Outpatient Cardiac Rehab     Frequency: Until Discontinued     Number of Occurrences: Until Specified    Positioning     Frequency: Until Discontinued     Number of Occurrences: Until Specified    Positioning     Frequency: Until Discontinued     Number of Occurrences: Until Specified    Post procedure site assessment     Frequency: As Directed     Number of Occurrences: Until Specified     Order Comments: Procedure site and peripheral vascular assessments to affected extremity every 15 minutes times 4, every 30 minutes times 2, every hour times 4, every 4 hours times 24 hours and PRN then per routine standard      Saline flush for dialysis machine     Frequency: PRN     Number of Occurrences: 1 Occurrences     Order Comments: Flush dialysis machine with minimum of 100 mL of sodium chloride 0.9% every thirty minutes PRN      Saline flush for dialysis machine     Frequency: PRN     Number of Occurrences: 1 Occurrences     Order Comments: Flush dialysis machine with minimum of 100 mL of sodium chloride 0.9% every thirty minutes PRN      Saline flush for dialysis machine     Frequency: PRN     Number of Occurrences: 1 Occurrences     Order Comments: Flush dialysis machine with minimum of 100 mL of sodium chloride 0.9% every thirty minutes PRN      Shower     Frequency: Until Discontinued     Number of Occurrences: Until Specified    Statin already ordered via med rec     Frequency: Until Discontinued      Number of Occurrences: Until Specified    Unable to obtain informed consent     Frequency: 1 Time     Number of Occurrences: 1 Occurrences     Order Comments: In my opinion, transfusion of the blood product(s) is required to reverse a life threatening or emergent condition and should not be delayed. It is impossible to obtain informed consent from the patient/representative at this time.      Verify informed consent     Frequency: 1 Time     Number of Occurrences: 1 Occurrences    Verify informed consent     Frequency: 1 Time     Number of Occurrences: 1 Occurrences     Order Comments: Dr. Black/Dr. Up: right above the knee amputation      Vital Signs     Frequency: As Directed     Number of Occurrences: Until Specified    Wound dressing     Frequency: Q Shift     Number of Occurrences: Until Specified     Order Comments: Dry gauze over right groin incision to keep area dry and clean  Change daily or more if needed     Other Diet    Nutrition Communication     Frequency: Continuous     Number of Occurrences: Until Specified   Point of Care Test DO NOT Enter Edit Result    Metered blood glucose     Frequency: PRN     Number of Occurrences: Until Specified     Order Comments: Metered blood glucose PRN for suspected hypoglycemia, hyperglycemia, and/or monitoring insulin infusion     Medications    acetaminophen (TYLENOL) tablet 650 mg     Frequency: Q4H     Dose: 650 mg     Route: Oral    albumin human (SPA) 25 % injection 25 g     Frequency: BID PRN     Dose: 25 g     Route: Intravenous    allopurinol (ZYLOPRIM) tablet 100 mg     Frequency: 3 days a week     Dose: 100 mg     Route: Oral     Order Comments: OP SIG:Take 100 mg by mouth 3 days a week. MWF 0900      alteplase (CATHFLO ACTIVASE) injection 2 mg     Frequency: PRN     Dose: 2 mg     Route: Intracatheter    alteplase (CATHFLO ACTIVASE) injection 2 mg     Frequency: PRN     Dose: 2 mg     Route: Intracatheter    aspirin (ECOTRIN) enteric coated tablet 81  mg     Frequency: Daily     Dose: 81 mg     Route: Oral    atorvastatin (LIPITOR) tablet 80 mg     Frequency: Nightly     Dose: 80 mg     Route: Oral     Order Comments: OP SIG:Take 1 tablet by mouth nightly.      calcitRIOL (ROCALTROL) capsule 0.25 mcg     Frequency: Daily     Dose: 0.25 mcg     Route: Oral    cinacalcet (SENSIPAR) tablet 90 mg     Frequency: Daily     Dose: 90 mg     Route: Oral     Order Comments: OP SIG:Take 1 tablet by mouth daily. 0900      clopidogrel (PLAVIX) tablet 75 mg     Frequency: Daily     Dose: 75 mg     Route: Oral     Order Comments: OP SIG:Take 75 mg by mouth daily.      epoetin lilliam-epbx (RETACRIT) 09129 UNIT/ML injection 10,000 Units     Frequency: 3 days a week     Dose: 10,000 Units     Route: Subcutaneous    furosemide (LASIX INJECT) injection 100 mg     Frequency: Once     Dose: 100 mg     Route: Intravenous    gabapentin (NEURONTIN) capsule 100 mg     Frequency: Q8H YUNIER     Dose: 100 mg     Route: Oral    heparin (porcine) injection 5,000 Units     Frequency: Q8H YUNIER     Dose: 5,000 Units     Route: Subcutaneous    metoPROLOL tartrate (LOPRESSOR) tablet 12.5 mg     Frequency: Q12H YUNIER     Dose: 12.5 mg     Route: Oral    oxyCODONE (IMM REL) (ROXICODONE) tablet 10 mg     Frequency: Q4H PRN     Dose: 10 mg     Route: Oral    oxyCODONE (IMM REL) (ROXICODONE) tablet 5 mg     Frequency: Once     Dose: 5 mg     Route: Oral    sevelamer carbonate (RENVELA) tablet 2,400 mg     Frequency: TID WC     Dose: 2,400 mg     Route: Oral     Order Comments: OP SIG:Take 3 tablets by mouth in the morning and 3 tablets at noon and 3 tablets in the evening. Take with meals. 0900 1300 1800      sodium chloride (NORMAL SALINE) 0.9 % bolus 100-200 mL     Frequency: PRN     Dose: 100-200 mL     Route: Intravenous    sodium chloride (NORMAL SALINE) 0.9 % bolus 100-200 mL     Frequency: PRN     Dose: 100-200 mL     Route: Intravenous    sodium chloride (PF) 0.9 % injection 2 mL     Frequency: Q12H  YUNIER     Dose: 2 mL     Route: Intracatheter    sodium chloride 0.9 % flush bag 25 mL     Frequency: PRN     Dose: 25 mL     Route: Intravenous    sodium chloride 0.9% infusion     Frequency: Continuous PRN     Route: Intravenous    sodium zirconium cyclosilicate (LOKELMA) packet 10 g     Frequency: TID     Dose: 10 g     Route: Oral     Disposition: discharge planning - PT/ OT consult - ? AIR?   PCP: Charly Lyon MD    Discussed plan of care with nurse, patient.  Sana Mohsin, DO     Time spend 35  min ,greater than 50% of the time spent reviewing the patient records, coordinating patient care plan and discussing the above care plan  with the patient ,Nurse and the consultant .       No

## 2024-10-24 NOTE — ED POST DISCHARGE NOTE - RESULT SUMMARY
UCX : E. Coli > 100,000 .ptt treated in ED with 1 dose of Ceftriaxone. According to ED provider note. Dr Gonsalez to follow up with patient and treat for UTI. Called [facility]  [phone] S/W Nurs  Latonya who said patient not on Abx. Discussed with Nurse Hanks will fax over UCX results and Nurse Latonya to give to Dr Barton to follow up with patient and prescribe appropriate ABX. According to Nurse Latonya patient doing well no fever, or chills. Faxed to  fax confirmation received. (0) No drift; leg holds 30 degree position for full 5 secs

## 2024-12-05 RX ORDER — CEFTRIAXONE SODIUM 1 G
1 VIAL (EA) INJECTION
Refills: 0 | DISCHARGE
Start: 2024-12-05 | End: 2024-12-09

## 2024-12-06 ENCOUNTER — INPATIENT (INPATIENT)
Facility: HOSPITAL | Age: 88
LOS: 3 days | Discharge: NURSING, REHAB OR LONG-TERM CARE | DRG: 683 | End: 2024-12-10
Attending: HOSPITALIST | Admitting: HOSPITALIST
Payer: MEDICAID

## 2024-12-06 VITALS
WEIGHT: 95.02 LBS | HEART RATE: 114 BPM | OXYGEN SATURATION: 95 % | SYSTOLIC BLOOD PRESSURE: 96 MMHG | HEIGHT: 60 IN | RESPIRATION RATE: 20 BRPM | TEMPERATURE: 98 F | DIASTOLIC BLOOD PRESSURE: 60 MMHG

## 2024-12-06 DIAGNOSIS — N19 UNSPECIFIED KIDNEY FAILURE: ICD-10-CM

## 2024-12-06 DIAGNOSIS — E87.0 HYPEROSMOLALITY AND HYPERNATREMIA: ICD-10-CM

## 2024-12-06 DIAGNOSIS — E87.20 ACIDOSIS, UNSPECIFIED: ICD-10-CM

## 2024-12-06 DIAGNOSIS — N17.9 ACUTE KIDNEY FAILURE, UNSPECIFIED: ICD-10-CM

## 2024-12-06 LAB
ADD ON TEST-SPECIMEN IN LAB: SIGNIFICANT CHANGE UP
ALBUMIN SERPL ELPH-MCNC: 2.7 G/DL — LOW (ref 3.3–5)
ALP SERPL-CCNC: 346 U/L — HIGH (ref 40–120)
ALT FLD-CCNC: 71 U/L — HIGH (ref 10–45)
ANION GAP SERPL CALC-SCNC: 20 MMOL/L — HIGH (ref 5–17)
ANION GAP SERPL CALC-SCNC: 21 MMOL/L — HIGH (ref 5–17)
APPEARANCE UR: CLEAR — SIGNIFICANT CHANGE UP
APTT BLD: 33.9 SEC — SIGNIFICANT CHANGE UP (ref 24.5–35.6)
AST SERPL-CCNC: 72 U/L — HIGH (ref 10–40)
BACTERIA # UR AUTO: NEGATIVE /HPF — SIGNIFICANT CHANGE UP
BASOPHILS # BLD AUTO: 0.06 K/UL — SIGNIFICANT CHANGE UP (ref 0–0.2)
BASOPHILS NFR BLD AUTO: 0.4 % — SIGNIFICANT CHANGE UP (ref 0–2)
BILIRUB SERPL-MCNC: 0.5 MG/DL — SIGNIFICANT CHANGE UP (ref 0.2–1.2)
BILIRUB UR-MCNC: NEGATIVE — SIGNIFICANT CHANGE UP
BUN SERPL-MCNC: 106 MG/DL — HIGH (ref 7–23)
BUN SERPL-MCNC: 113 MG/DL — HIGH (ref 7–23)
CALCIUM SERPL-MCNC: 7.2 MG/DL — LOW (ref 8.4–10.5)
CALCIUM SERPL-MCNC: 7.4 MG/DL — LOW (ref 8.4–10.5)
CAST: 4 /LPF — SIGNIFICANT CHANGE UP (ref 0–4)
CHLORIDE SERPL-SCNC: 123 MMOL/L — HIGH (ref 96–108)
CHLORIDE SERPL-SCNC: 125 MMOL/L — HIGH (ref 96–108)
CO2 SERPL-SCNC: 10 MMOL/L — CRITICAL LOW (ref 22–31)
CO2 SERPL-SCNC: 12 MMOL/L — LOW (ref 22–31)
COLOR SPEC: YELLOW — SIGNIFICANT CHANGE UP
CREAT SERPL-MCNC: 3.04 MG/DL — HIGH (ref 0.5–1.3)
CREAT SERPL-MCNC: 3.32 MG/DL — HIGH (ref 0.5–1.3)
DIFF PNL FLD: ABNORMAL
EGFR: 13 ML/MIN/1.73M2 — LOW
EGFR: 14 ML/MIN/1.73M2 — LOW
EOSINOPHIL # BLD AUTO: 1.35 K/UL — HIGH (ref 0–0.5)
EOSINOPHIL NFR BLD AUTO: 9.2 % — HIGH (ref 0–6)
FLUAV AG NPH QL: SIGNIFICANT CHANGE UP
FLUBV AG NPH QL: SIGNIFICANT CHANGE UP
GAS PNL BLDV: SIGNIFICANT CHANGE UP
GAS PNL BLDV: SIGNIFICANT CHANGE UP
GLUCOSE SERPL-MCNC: 134 MG/DL — HIGH (ref 70–99)
GLUCOSE SERPL-MCNC: 88 MG/DL — SIGNIFICANT CHANGE UP (ref 70–99)
GLUCOSE UR QL: NEGATIVE — SIGNIFICANT CHANGE UP
HCT VFR BLD CALC: 32.1 % — LOW (ref 34.5–45)
HGB BLD-MCNC: 9 G/DL — LOW (ref 11.5–15.5)
IMM GRANULOCYTES NFR BLD AUTO: 1.2 % — HIGH (ref 0–0.9)
INR BLD: 1.09 RATIO — SIGNIFICANT CHANGE UP (ref 0.85–1.16)
KETONES UR-MCNC: NEGATIVE — SIGNIFICANT CHANGE UP
LEUKOCYTE ESTERASE UR-ACNC: ABNORMAL
LYMPHOCYTES # BLD AUTO: 17.5 % — SIGNIFICANT CHANGE UP (ref 13–44)
LYMPHOCYTES # BLD AUTO: 2.56 K/UL — SIGNIFICANT CHANGE UP (ref 1–3.3)
MCHC RBC-ENTMCNC: 28 G/DL — LOW (ref 32–36)
MCHC RBC-ENTMCNC: 29.2 PG — SIGNIFICANT CHANGE UP (ref 27–34)
MCV RBC AUTO: 104.2 FL — HIGH (ref 80–100)
MONOCYTES # BLD AUTO: 0.78 K/UL — SIGNIFICANT CHANGE UP (ref 0–0.9)
MONOCYTES NFR BLD AUTO: 5.3 % — SIGNIFICANT CHANGE UP (ref 2–14)
MUCOUS THREADS # UR AUTO: PRESENT
NEUTROPHILS # BLD AUTO: 9.72 K/UL — HIGH (ref 1.8–7.4)
NEUTROPHILS NFR BLD AUTO: 66.4 % — SIGNIFICANT CHANGE UP (ref 43–77)
NITRITE UR-MCNC: NEGATIVE — SIGNIFICANT CHANGE UP
NRBC # BLD: 1 /100 WBCS — HIGH (ref 0–0)
PH UR: 5.5 — SIGNIFICANT CHANGE UP (ref 5–8)
PLATELET # BLD AUTO: 247 K/UL — SIGNIFICANT CHANGE UP (ref 150–400)
POTASSIUM SERPL-MCNC: 3.5 MMOL/L — SIGNIFICANT CHANGE UP (ref 3.5–5.3)
POTASSIUM SERPL-MCNC: 4.2 MMOL/L — SIGNIFICANT CHANGE UP (ref 3.5–5.3)
POTASSIUM SERPL-SCNC: 3.5 MMOL/L — SIGNIFICANT CHANGE UP (ref 3.5–5.3)
POTASSIUM SERPL-SCNC: 4.2 MMOL/L — SIGNIFICANT CHANGE UP (ref 3.5–5.3)
PROT SERPL-MCNC: 7.6 G/DL — SIGNIFICANT CHANGE UP (ref 6–8.3)
PROT UR-MCNC: ABNORMAL
PROTHROM AB SERPL-ACNC: 12.4 SEC — SIGNIFICANT CHANGE UP (ref 9.9–13.4)
RBC # BLD: 3.08 M/UL — LOW (ref 3.8–5.2)
RBC # FLD: 15.7 % — HIGH (ref 10.3–14.5)
RBC CASTS # UR COMP ASSIST: 5 /HPF — HIGH (ref 0–4)
REVIEW: SIGNIFICANT CHANGE UP
RSV RNA NPH QL NAA+NON-PROBE: SIGNIFICANT CHANGE UP
SARS-COV-2 RNA SPEC QL NAA+PROBE: SIGNIFICANT CHANGE UP
SODIUM SERPL-SCNC: 155 MMOL/L — HIGH (ref 135–145)
SODIUM SERPL-SCNC: 156 MMOL/L — HIGH (ref 135–145)
SP GR SPEC: 1.02 — SIGNIFICANT CHANGE UP (ref 1–1.03)
SQUAMOUS # UR AUTO: 4 /HPF — SIGNIFICANT CHANGE UP (ref 0–5)
TROPONIN T, HIGH SENSITIVITY RESULT: 129 NG/L — HIGH (ref 0–51)
TROPONIN T, HIGH SENSITIVITY RESULT: 97 NG/L — HIGH (ref 0–51)
UROBILINOGEN FLD QL: 1 MG/DL — SIGNIFICANT CHANGE UP
WBC # BLD: 14.65 K/UL — HIGH (ref 3.8–10.5)
WBC # FLD AUTO: 14.65 K/UL — HIGH (ref 3.8–10.5)
WBC UR QL: 24 /HPF — HIGH (ref 0–5)

## 2024-12-06 PROCEDURE — 99222 1ST HOSP IP/OBS MODERATE 55: CPT | Mod: GC

## 2024-12-06 PROCEDURE — 93010 ELECTROCARDIOGRAM REPORT: CPT

## 2024-12-06 PROCEDURE — 99285 EMERGENCY DEPT VISIT HI MDM: CPT

## 2024-12-06 RX ORDER — CEFTRIAXONE SODIUM 1 G
1000 VIAL (EA) INJECTION ONCE
Refills: 0 | Status: COMPLETED | OUTPATIENT
Start: 2024-12-06 | End: 2024-12-06

## 2024-12-06 RX ORDER — HEPARIN SODIUM,PORCINE 1000/ML
5000 VIAL (ML) INJECTION EVERY 12 HOURS
Refills: 0 | Status: DISCONTINUED | OUTPATIENT
Start: 2024-12-06 | End: 2024-12-10

## 2024-12-06 RX ORDER — SODIUM BICARBONATE 84 MG/ML
0.52 INJECTION, SOLUTION INTRAVENOUS
Qty: 150 | Refills: 0 | Status: DISCONTINUED | OUTPATIENT
Start: 2024-12-06 | End: 2024-12-07

## 2024-12-06 RX ORDER — CEFTRIAXONE SODIUM 1 G
1000 VIAL (EA) INJECTION EVERY 24 HOURS
Refills: 0 | Status: DISCONTINUED | OUTPATIENT
Start: 2024-12-06 | End: 2024-12-07

## 2024-12-06 RX ORDER — MIRTAZAPINE 15 MG/1
7.5 TABLET, FILM COATED ORAL AT BEDTIME
Refills: 0 | Status: DISCONTINUED | OUTPATIENT
Start: 2024-12-06 | End: 2024-12-10

## 2024-12-06 RX ORDER — SENNOSIDES 8.6 MG
2 TABLET ORAL
Refills: 0 | DISCHARGE

## 2024-12-06 RX ORDER — AMMONIUM LACTATE 120 MG/G
0 LOTION TOPICAL
Refills: 0 | DISCHARGE

## 2024-12-06 RX ORDER — ACETAMINOPHEN 500MG 500 MG/1
650 TABLET, COATED ORAL EVERY 6 HOURS
Refills: 0 | Status: DISCONTINUED | OUTPATIENT
Start: 2024-12-06 | End: 2024-12-07

## 2024-12-06 RX ORDER — ACETAMINOPHEN 500MG 500 MG/1
2 TABLET, COATED ORAL
Refills: 0 | DISCHARGE

## 2024-12-06 RX ORDER — SENNOSIDES 8.6 MG
2 TABLET ORAL AT BEDTIME
Refills: 0 | Status: DISCONTINUED | OUTPATIENT
Start: 2024-12-06 | End: 2024-12-10

## 2024-12-06 RX ORDER — LEVOTHYROXINE SODIUM 150 MCG
75 TABLET ORAL DAILY
Refills: 0 | Status: DISCONTINUED | OUTPATIENT
Start: 2024-12-06 | End: 2024-12-10

## 2024-12-06 RX ORDER — 0.9 % SODIUM CHLORIDE 0.9 %
1000 INTRAVENOUS SOLUTION INTRAVENOUS ONCE
Refills: 0 | Status: COMPLETED | OUTPATIENT
Start: 2024-12-06 | End: 2024-12-06

## 2024-12-06 RX ORDER — LEVOTHYROXINE SODIUM 150 MCG
1 TABLET ORAL
Refills: 0 | DISCHARGE

## 2024-12-06 RX ADMIN — Medication 1000 MILLILITER(S): at 12:58

## 2024-12-06 RX ADMIN — Medication 100 MILLIGRAM(S): at 14:28

## 2024-12-06 RX ADMIN — SODIUM BICARBONATE 150 MEQ/KG/HR: 84 INJECTION, SOLUTION INTRAVENOUS at 22:45

## 2024-12-06 RX ADMIN — Medication 1000 MILLILITER(S): at 16:00

## 2024-12-06 NOTE — ED PROVIDER NOTE - OBJECTIVE STATEMENT
89-year-old female brought in by EMS from nursing home to the ED for abnormal lab results.  per lab analysis done at the facility earlier this morning patient was found to have a BUN of 121 creatinine of 3.18 and leukocytosis of 17.3, sodium 160 and an anion gap of 21.  Patient is nonverbal at baseline and all information was taken from charts that came from the facility.  Unable to obtain ROS from patient

## 2024-12-06 NOTE — ED ADULT NURSE NOTE - NSFALLHARMRISKINTERV_ED_ALL_ED

## 2024-12-06 NOTE — H&P ADULT - HISTORY OF PRESENT ILLNESS
89 yo  with PMH of PVD, dysphagia, hypothyroid, CAD, depression. dementia, FTT, on palliative, Comfort care, had viral gastroenteritis in SNF and N/V for a few days which resolved, her lab showed severe hyper natremia and ARF. She was given IVF in SNF and one dose of IV Ceftriaxone but her hyper natremia not improved. I dscussed with her family and offered keep her as comfort  care but they asked for non invasive treatment to send to hospital.

## 2024-12-06 NOTE — H&P ADULT - NSICDXPASTMEDICALHX_GEN_ALL_CORE_FT
PAST MEDICAL HISTORY:  Dementia     Hypothyroid     Major depression     No pertinent past medical history     Osteoarthritis

## 2024-12-06 NOTE — PATIENT PROFILE ADULT - NSPROPTRIGHTNOTIFY_GEN_A_NUR
information could not be obtained 77-year-old female with PMH current smoker , COPD, A-fib  On Eliquis and Plavix, HTN, HLD presents for syncope from PCPs office yesterday.     Patient was at normal checkup for blood work and was sitting on the table.  Daughter at bedside states that patient suddenly went limp while she was sitting on the table, her eyes rolled back, and her tongue turned to 1 side and this lasted for couple of seconds and then the patient regained consciousness.  Patient had no postictal period.  No tongue biting.  Patient does not remember these events. pt too lethargic, though ar[place], knows she is in NS, denies any sx   but tenderneness on abd exam. patient admitted to medicine.     Neurology team consulted. CT head- negative. 2/21 Patient with Lactate 3 and 1/episode of non-bloody, non-bilious emesis. CT abd/pelvis was done which demonstrated  Ischemic/necrotic small bowel within the pelvis. ACS surgery tem was consulted. on 2/22 patient was taken emergently to the operating room and underwent diagnostic laparoscopy converted to exploratory laparotomy with 20 cm of terminal ileum resection with bowel left in discontinuity and temporary abdominal closure with abthera with mesenteric angiogram via R fem access. mesenteric angiogram done by vascular surgery Mesenteric angiogram via R fem 5 F sheath showed No filling of SMA, SWATHI, or Celiac. No endo treatment modalities available.  Post - operatively patient taken to SICU for close hemodynamic monitoring. Pt intubated, on ventilator in SICU. Not requiring pressors.     In SICU 2/22 patient went into post op afib RVR to 150s x2, 5m IV lopressor x2 w/ response, bcx sent, heparin ggt initiated for intraop angiogram findings. 2/23 patient was extubated. 2/24 patient taken back to OR for re-exploration, end ileostomy, and closure. The patient tolerated the procedure well without complications, was extubated, and transferred to the PACU in stable condition. Diet was advanced as tolerated and patient was treated with pain control.     3/3 patient was transferred from SICU to floor. Physical therapy worked with patient and recommended subacute rehab.     77-year-old female with PMH current smoker , COPD, A-fib  On Eliquis and Plavix, HTN, HLD presents for syncope from PCPs office yesterday.     Patient was at normal checkup for blood work and was sitting on the table.  Daughter at bedside states that patient suddenly went limp while she was sitting on the table, her eyes rolled back, and her tongue turned to 1 side and this lasted for couple of seconds and then the patient regained consciousness.  Patient had no postictal period.  No tongue biting.  Patient does not remember these events. pt too lethargic, though ar[place], knows she is in NS, denies any sx   but tenderneness on abd exam. patient admitted to medicine.     Neurology team consulted. CT head- negative. 2/21 Patient with Lactate 3 and 1/episode of non-bloody, non-bilious emesis. CT abd/pelvis was done which demonstrated  Ischemic/necrotic small bowel within the pelvis. ACS surgery tem was consulted. on 2/22 patient was taken emergently to the operating room and underwent diagnostic laparoscopy converted to exploratory laparotomy with 20 cm of terminal ileum resection with bowel left in discontinuity and temporary abdominal closure with abthera with mesenteric angiogram via R fem access. mesenteric angiogram done by vascular surgery Mesenteric angiogram via R fem 5 F sheath showed No filling of SMA, SWATHI, or Celiac. No endo treatment modalities available.  Post - operatively patient taken to SICU for close hemodynamic monitoring. Pt intubated, on ventilator in SICU. Not requiring pressors.     In SICU 2/22 patient went into post op afib RVR to 150s x2, 5m IV lopressor x2 w/ response, bcx sent, heparin ggt initiated for intraop angiogram findings. 2/23 patient was extubated. 2/24 patient taken back to OR for re-exploration, end ileostomy, and closure. The patient tolerated the procedure well without complications, was extubated, and transferred to the PACU in stable condition. Diet was advanced as tolerated and patient was treated with pain control.     On 3/3 patient was transferred from SICU to floor and continued recovered.  Physical therapy worked with patient and recommended subacute rehab.  Patient was started on antibiotics for UTI and will complete the course with oral antibiotics for total of 3 days. Patient developed urinary retention and failed TOV.  Patient will be discharged with amaya catheter with outpatient follow up at [facility].    She is to follow up in 1-2 weeks in [facility] clinic.  She is to also follow up outpatient with vascular surgery and cardiology.

## 2024-12-06 NOTE — ED PROVIDER NOTE - PROGRESS NOTE DETAILS
Sabrina Esquivel DO, (PGY2): nephrology consulted. pending recs. Sabrina Esquivel DO, (PGY2): attempted to call pt's son twice to give him an update but there was no answer. Sabrina Esquivel DO, (PGY2): labs remarkable for leukocytosis and a + urinalysis. will start abx w/ ceftriaxone.

## 2024-12-06 NOTE — H&P ADULT - TIME BILLING
d/w ER attending and resident about her HUMAIRA.   d/w family about ACP and reviewed MOLST form as DNR, DNI for 30 min

## 2024-12-06 NOTE — CONSULT NOTE ADULT - SUBJECTIVE AND OBJECTIVE BOX
[facility] DIVISION OF KIDNEY DISEASES AND HYPERTENSION -- [phone]  -- INITIAL CONSULT NOTE  --------------------------------------------------------------------------------  HPI:  89 F with hx of CKD and non-verbal at baseline presents from NH after after she was noted to have abnormal labs with elevated Scr level. Nephrology consulted for HUMAIRA on CKD.    Per HIE review, pt has baseline Scr of 1.3-1.7 in 2024. She had lab work done on 12/4 showing Scr of 2.5 that increased on admission to 3.3.     Pt is non-verbal at baseline and could not obtain any hx. Pt was noted to have HUMAIRA with mild hypernatremia and started on IVF. She has an improvement of Na level from 161 on 12/4 to 155 today. No phone numbers noted in chart to call family. Information was obtained from chart review.      PAST HISTORY  --------------------------------------------------------------------------------  PAST MEDICAL & SURGICAL HISTORY:  Dementia      Hypothyroid      Major depression      Osteoarthritis      No pertinent past medical history      No significant past surgical history      No significant past surgical history        FAMILY HISTORY:    PAST SOCIAL HISTORY:    ALLERGIES & MEDICATIONS  --------------------------------------------------------------------------------  Allergies    No Known Allergies    Intolerances      Standing Inpatient Medications    PRN Inpatient Medications      REVIEW OF SYSTEMS  --------------------------------------------------------------------------------  Could not obtain.    VITALS/PHYSICAL EXAM  --------------------------------------------------------------------------------  T(C): 37.8 (12-06-24 @ 11:25), Max: 37.8 (12-06-24 @ 11:25)  HR: 105 (12-06-24 @ 12:41) (105 - 114)  BP: 99/54 (12-06-24 @ 12:41) (96/60 - 100/53)  RR: 18 (12-06-24 @ 12:41) (18 - 20)  SpO2: 97% (12-06-24 @ 12:41) (95% - 97%)  Wt(kg): --  Height (cm): 152.4 (12-06-24 @ 10:44)  Weight (kg): 43.1 (12-06-24 @ 10:44)  BMI (kg/m2): 18.6 (12-06-24 @ 10:44)  BSA (m2): 1.36 (12-06-24 @ 10:44)      Physical Exam:  	Gen: NAD  	Pulm: Mild wheezing  	CV: S1S2  	Abd: Soft, +BS   	Ext: No LE edema B/L  	Neuro: Awake  	Skin: Warm and dry  LABS/STUDIES  --------------------------------------------------------------------------------              9.0    14.65 >-----------<  247      [12-06-24 @ 11:54]              32.1     155  |  123  |  113  ----------------------------<  134      [12-06-24 @ 11:54]  3.5   |  12  |  3.32        Ca     7.2     [12-06-24 @ 11:54]    TPro  7.6  /  Alb  2.7  /  TBili  0.5  /  DBili  x   /  AST  72  /  ALT  71  /  AlkPhos  346  [12-06-24 @ 11:54]    PT/INR: PT 12.4 , INR 1.09       [12-06-24 @ 11:54]  PTT: 33.9       [12-06-24 @ 11:54]      Creatinine Trend:  SCr 3.32 [12-06 @ 11:54]    Urinalysis - [12-06-24 @ 12:50]      Color Yellow / Appearance Clear / SG 1.020 / pH 5.5      Gluc Negative / Ketone Negative  / Bili Negative / Urobili 1.0       Blood Trace / Protein 100 mg/dL / Leuk Est Small / Nitrite Negative      RBC 5 / WBC 24 / Hyaline  / Gran  / Sq Epi  / Non Sq Epi 4 / Bacteria Negative

## 2024-12-06 NOTE — PATIENT PROFILE ADULT - FALL HARM RISK - HARM RISK INTERVENTIONS

## 2024-12-06 NOTE — CONSULT NOTE ADULT - PROBLEM SELECTOR RECOMMENDATION 3
Pt with noted HCO3 level of 12 likely in setting of HUMAIRA. Continue with LR and repeat labs. If there is no improvement in HCO3, can add HCO3 tablets. Continue to monitor levels.

## 2024-12-06 NOTE — ED ADULT NURSE NOTE - ED STAT RN HANDOFF DETAILS
1430 Report given to receiving change of shift TANGELA ROSA patient is in no acute distress. Patient vital signs stable, plan of care explained.

## 2024-12-06 NOTE — CONSULT NOTE ADULT - PROBLEM SELECTOR RECOMMENDATION 2
Pt. with hypernatremia in setting of impaired free water intake. On review of previous labs in [place], serum sodium was 161 on 12/4. She has a hx of hypernatremia of 146-152 between 02/2024-05/2024. On admission, patient's serum sodium was 155. C/w with IVF. Check urine osmolality. Continue to monitor levels.

## 2024-12-06 NOTE — ED PROVIDER NOTE - PHYSICAL EXAMINATION
Physical Exam:  Gen:  non verbal,   Head: NCAT  HEENT: Normal conjunctiva, trachea midline,   Lung:  no respiratory distress  CV: RRR, no murmurs, rubs or gallops  Abd: Soft, non-tender,   MSK: No visible deformities  Neuro: No focal motor deficits  Skin: Warm, well perfused,   Psych: appropriate affect and mood

## 2024-12-06 NOTE — CONSULT NOTE ADULT - PROBLEM SELECTOR RECOMMENDATION 9
Pt with HUMAIRA on CKD likely pre-renal in setting of dehydration, anemia. Per HIE review, pt has baseline Scr of 1.3-1.7 in 2024. She had lab work done on 12/4 showing Scr of 2.5 that increased on admission to 3.3. UA showed 100 protein, trace blood, 24 wbc's. Check urine electrolytes, spot urine TP/CR. Per chart, ER did bladder US showed 100cc in bladder. Agree with IVF for now. Rec to get CXR. Pt also noted to have hgb level of 11.6 in 07/2024 that has been downtrending. Monitor labs and urine output. Avoid NSAIDs, ACEI/ARBS, RCA and nephrotoxins. Dose medications as per eGFR. Pt with HUMAIRA on CKD likely pre-renal in setting of dehydration, anemia. Per HIE review, pt has baseline Scr of 1.3-1.7 in 2024. She had lab work done on 12/4 showing Scr of 2.5 that increased on admission to 3.3. UA showed 100 protein, trace blood, 24 wbc's. Check urine electrolytes, spot urine TP/CR and renal US. Per chart, ER did bladder US showed 100cc in bladder. Agree with IVF for now. Rec to get CXR. Pt also noted to have hgb level of 11.6 in 07/2024 that has been downtrending. Monitor labs and urine output. Avoid NSAIDs, ACEI/ARBS, RCA and nephrotoxins. Dose medications as per eGFR.

## 2024-12-06 NOTE — ED PROVIDER NOTE - CLINICAL SUMMARY MEDICAL DECISION MAKING FREE TEXT BOX
89-year-old female brought in by EMS from nursing home to the ED for abnormal lab results.  per lab analysis done at the facility earlier this morning patient was found to have a BUN of 121 creatinine of 3.18 and leukocytosis of 17.3, sodium 160 and an anion gap of 21.  Patient is nonverbal at baseline and all information was taken from charts that came from the facility.  Unable to obtain ROS from patient  given lack of history able to obtain and vitals on arrival, will order sepsis lab set. ecg for any elevations, peaked t waves, bedside ultrasound, chest xray for pna/effusions/consolidations, rectal temp, and re-assess. will hold off on fluids for now.

## 2024-12-06 NOTE — ED PROVIDER NOTE - ATTENDING CONTRIBUTION TO CARE
89yo F with advanced dementia (bedbound; minimally verbal) sent in from a skilled nursing facility due to elevated BUN/Creatinine seen on labwork. Labwork shows Hg of 10, , creatinine of 3.5. Unknown baseline. Paperwork with DNR/DNI. Patient borderline febrile here. Etiologies include dehydration, blood loss (possible GI), ATN from sepsis. Bedside POCUS with bladder with roughly <100ml of urine, no hydronephrosis. Lung ultrasound with travel pleural effusions and without b-lines in the lungs. Will obtain labwork, CXR, work up for infection and likely admit.

## 2024-12-06 NOTE — CONSULT NOTE ADULT - ATTENDING COMMENTS
Pt. with HUMAIRA on CKD in the setting of hypernatremia/dehydration. On review of [place], SCr noted to be persistently elevated since March 2019. SCr was elevated at 1.37 on 7/4/24, and increased to 3.18 on 12/5/24. SCr initially during this admission remained elevated at 3.32. SNa elevated at 155. pH decreased to 7.15, and SCO2 decreased to 12. Pt. with likely pre-renal HUMAIRA/ATN. Agree with IV LR. Recommend to repeat BMP and VGB. Will consider IV sodium bicarbonate infusion pending repeat labs. Check UA, UPCR, urine lytes, and kidney sonogram. Monitor labs and urine output. Avoid nephrotoxins. Dose medications as per eGFR.     If you have any questions, please feel free to contact me  Claudia Butt  Nephrology  [phone] / [employer] Teams(Preferred)  (After 4 pm or on weekends please page the on-call fellow)

## 2024-12-06 NOTE — ED ADULT NURSE NOTE - OBJECTIVE STATEMENT
89yo F aaox0 non verbal presents o Ed  from NH, as per EMS pt had blood work done , with results BUN elevated 121, also pt cough and weakness as per EMS , no more informations , pt is base line non verbal, constrict , no distress noted, blader scanned: 77 cc urine noted on the bladder MD Ball made aware, Pt placed on primatfit. Pt tolerated well. 2 staff members at the bedside.  Safety and comfort measures initiated- bed placed in lowest position and side rails raised.

## 2024-12-06 NOTE — H&P ADULT - ASSESSMENT
F with PMH of PVD, dysphagia, hypothyroid, CAD, depression. dementia.      Dementia with depression - On Mirtazapine, f/u with psych.  Hypothyroidism - Levothyroxine 75 mcg, monitor TSH.   CAD - on ASA, off of statin due to advanced age.   PVD - on ASA.  Weight loss: due to anorexia. monitor weight and encourage po diet and supplement diet.  Covid Viral syndrome- asymptomatic.  Palliative care: on comfort care, DNR, DNI,DNH.  F with PMH of PVD, dysphagia, hypothyroid, CAD, depression. dementia.    Viral gastroenteritis - resolved   Hypernatremia - improving, continue IV Hydration, monitor Na.   HUMAIRA - most likely prerenal, oliguric, insert Fley cath, continue IVF, less likely  ATN.  Metabolic acidosis with AG - most likely due to lactic acidosis and HUMAIRA. started on Bicarb drip. f/u with nephrologist.   Leukocytosis - due to UTI. treat with IV Ceftriaxone, f/u U/C .     Metabolic encephalopathy - worse from baseline [facility] dementia due to HUMAIRA, UTI, hypernatremia   Dementia with depression - On Mirtazapine, f/u with psych.  Hypothyroidism - Levothyroxine 75 mcg, monitor TSH.   CAD - on ASA, off of statin due to advanced age.   PVD - on ASA.  Weight loss: due to anorexia. monitor weight and encourage po diet and supplement die  Palliative care: on comfort care, DNR, DNI, was on comfort care. if not improves in 24 h, will consider comfort care in PCU. other wise family does not want any aggressive treatment.   DVT ppx - Heparin SC, SCD.

## 2024-12-07 LAB
ANION GAP SERPL CALC-SCNC: 21 MMOL/L — HIGH (ref 5–17)
BUN SERPL-MCNC: 109 MG/DL — HIGH (ref 7–23)
CALCIUM SERPL-MCNC: 6.8 MG/DL — LOW (ref 8.4–10.5)
CHLORIDE SERPL-SCNC: 118 MMOL/L — HIGH (ref 96–108)
CO2 SERPL-SCNC: 17 MMOL/L — LOW (ref 22–31)
CREAT ?TM UR-MCNC: 64 MG/DL — SIGNIFICANT CHANGE UP
CREAT SERPL-MCNC: 2.83 MG/DL — HIGH (ref 0.5–1.3)
EGFR: 15 ML/MIN/1.73M2 — LOW
GAS PNL BLDV: SIGNIFICANT CHANGE UP
GLUCOSE SERPL-MCNC: 117 MG/DL — HIGH (ref 70–99)
HCT VFR BLD CALC: 27 % — LOW (ref 34.5–45)
HGB BLD-MCNC: 7.7 G/DL — LOW (ref 11.5–15.5)
MCHC RBC-ENTMCNC: 28.5 G/DL — LOW (ref 32–36)
MCHC RBC-ENTMCNC: 28.9 PG — SIGNIFICANT CHANGE UP (ref 27–34)
MCV RBC AUTO: 101.5 FL — HIGH (ref 80–100)
NRBC # BLD: 0 /100 WBCS — SIGNIFICANT CHANGE UP (ref 0–0)
OSMOLALITY UR: 545 MOS/KG — SIGNIFICANT CHANGE UP (ref 300–900)
PLATELET # BLD AUTO: 225 K/UL — SIGNIFICANT CHANGE UP (ref 150–400)
POTASSIUM SERPL-MCNC: 3.7 MMOL/L — SIGNIFICANT CHANGE UP (ref 3.5–5.3)
POTASSIUM SERPL-SCNC: 3.7 MMOL/L — SIGNIFICANT CHANGE UP (ref 3.5–5.3)
POTASSIUM UR-SCNC: 25 MMOL/L — SIGNIFICANT CHANGE UP
PROT ?TM UR-MCNC: 92 MG/DL — HIGH (ref 0–12)
PROT/CREAT UR-RTO: 1.4 RATIO — HIGH (ref 0–0.2)
RBC # BLD: 2.66 M/UL — LOW (ref 3.8–5.2)
RBC # FLD: 15.5 % — HIGH (ref 10.3–14.5)
SODIUM SERPL-SCNC: 156 MMOL/L — HIGH (ref 135–145)
SODIUM UR-SCNC: 52 MMOL/L — SIGNIFICANT CHANGE UP
UUN UR-MCNC: 995 MG/DL — SIGNIFICANT CHANGE UP
WBC # BLD: 12.05 K/UL — HIGH (ref 3.8–10.5)
WBC # FLD AUTO: 12.05 K/UL — HIGH (ref 3.8–10.5)

## 2024-12-07 PROCEDURE — 71045 X-RAY EXAM CHEST 1 VIEW: CPT | Mod: 26

## 2024-12-07 PROCEDURE — 99232 SBSQ HOSP IP/OBS MODERATE 35: CPT | Mod: GC

## 2024-12-07 RX ORDER — PIPERACILLIN SODIUM AND TAZOBACTAM SODIUM 4; .5 G/20ML; G/20ML
3.38 INJECTION, POWDER, LYOPHILIZED, FOR SOLUTION INTRAVENOUS ONCE
Refills: 0 | Status: COMPLETED | OUTPATIENT
Start: 2024-12-07 | End: 2024-12-07

## 2024-12-07 RX ORDER — IPRATROPIUM BROMIDE AND ALBUTEROL SULFATE 2.5; .5 MG/3ML; MG/3ML
3 SOLUTION RESPIRATORY (INHALATION) ONCE
Refills: 0 | Status: COMPLETED | OUTPATIENT
Start: 2024-12-07 | End: 2024-12-07

## 2024-12-07 RX ORDER — ACETAMINOPHEN 500MG 500 MG/1
650 TABLET, COATED ORAL ONCE
Refills: 0 | Status: COMPLETED | OUTPATIENT
Start: 2024-12-07 | End: 2024-12-07

## 2024-12-07 RX ORDER — BUDESONIDE 0.25 MG/2ML
0.5 SUSPENSION RESPIRATORY (INHALATION) EVERY 12 HOURS
Refills: 0 | Status: DISCONTINUED | OUTPATIENT
Start: 2024-12-07 | End: 2024-12-10

## 2024-12-07 RX ORDER — 0.9 % SODIUM CHLORIDE 0.9 %
1000 INTRAVENOUS SOLUTION INTRAVENOUS
Refills: 0 | Status: DISCONTINUED | OUTPATIENT
Start: 2024-12-07 | End: 2024-12-10

## 2024-12-07 RX ORDER — IPRATROPIUM BROMIDE AND ALBUTEROL SULFATE 2.5; .5 MG/3ML; MG/3ML
3 SOLUTION RESPIRATORY (INHALATION) EVERY 6 HOURS
Refills: 0 | Status: DISCONTINUED | OUTPATIENT
Start: 2024-12-07 | End: 2024-12-10

## 2024-12-07 RX ORDER — ACETAMINOPHEN 500MG 500 MG/1
650 TABLET, COATED ORAL ONCE
Refills: 0 | Status: DISCONTINUED | OUTPATIENT
Start: 2024-12-07 | End: 2024-12-07

## 2024-12-07 RX ORDER — 0.9 % SODIUM CHLORIDE 0.9 %
250 INTRAVENOUS SOLUTION INTRAVENOUS
Refills: 0 | Status: DISCONTINUED | OUTPATIENT
Start: 2024-12-07 | End: 2024-12-10

## 2024-12-07 RX ORDER — PIPERACILLIN SODIUM AND TAZOBACTAM SODIUM 4; .5 G/20ML; G/20ML
3.38 INJECTION, POWDER, LYOPHILIZED, FOR SOLUTION INTRAVENOUS EVERY 12 HOURS
Refills: 0 | Status: DISCONTINUED | OUTPATIENT
Start: 2024-12-08 | End: 2024-12-09

## 2024-12-07 RX ADMIN — IPRATROPIUM BROMIDE AND ALBUTEROL SULFATE 3 MILLILITER(S): 2.5; .5 SOLUTION RESPIRATORY (INHALATION) at 10:20

## 2024-12-07 RX ADMIN — PIPERACILLIN SODIUM AND TAZOBACTAM SODIUM 200 GRAM(S): 4; .5 INJECTION, POWDER, LYOPHILIZED, FOR SOLUTION INTRAVENOUS at 13:25

## 2024-12-07 RX ADMIN — IPRATROPIUM BROMIDE AND ALBUTEROL SULFATE 3 MILLILITER(S): 2.5; .5 SOLUTION RESPIRATORY (INHALATION) at 17:04

## 2024-12-07 RX ADMIN — ACETAMINOPHEN 500MG 260 MILLIGRAM(S): 500 TABLET, COATED ORAL at 10:32

## 2024-12-07 RX ADMIN — BUDESONIDE 0.5 MILLIGRAM(S): 0.25 SUSPENSION RESPIRATORY (INHALATION) at 17:04

## 2024-12-07 RX ADMIN — Medication 75 MILLILITER(S): at 17:56

## 2024-12-07 RX ADMIN — Medication 5000 UNIT(S): at 17:04

## 2024-12-07 RX ADMIN — Medication 2 TABLET(S): at 21:30

## 2024-12-07 RX ADMIN — MIRTAZAPINE 7.5 MILLIGRAM(S): 15 TABLET, FILM COATED ORAL at 21:30

## 2024-12-07 RX ADMIN — Medication 5000 UNIT(S): at 05:10

## 2024-12-07 RX ADMIN — Medication 125 MILLILITER(S): at 15:20

## 2024-12-07 RX ADMIN — PIPERACILLIN SODIUM AND TAZOBACTAM SODIUM 25 GRAM(S): 4; .5 INJECTION, POWDER, LYOPHILIZED, FOR SOLUTION INTRAVENOUS at 21:30

## 2024-12-07 RX ADMIN — ACETAMINOPHEN 500MG 650 MILLIGRAM(S): 500 TABLET, COATED ORAL at 13:15

## 2024-12-07 NOTE — PROGRESS NOTE ADULT - PROBLEM SELECTOR PLAN 2
Pt. with hypernatremia in setting of impaired free water intake. On review of previous labs in [place], serum sodium was 161 on 12/4. She has a hx of hypernatremia of 146-152 between 02/2024-05/2024. On admission, patient's serum sodium was 155. Remains elevated at 156.    -Encourage PO hydration. Will possibly change IVF to LR or half NS given hypotension and hypernatremia.

## 2024-12-07 NOTE — PROGRESS NOTE ADULT - PROBLEM SELECTOR PLAN 3
Pt with noted HCO3 level of 12 likely in setting of HUMAIRA.   On bicarb gtt.   Check VBG. Will deicide if need to change fluids.        Alfred Matias  Nephrology Fellow  Feel free to contact me on TEAMS  After 5 pm and on weekends please contact the on-call Fellow.

## 2024-12-07 NOTE — CONSULT NOTE ADULT - SUBJECTIVE AND OBJECTIVE BOX
12-07-24 @ 14:29    Patient is a 89y old  Female who presents with a chief complaint of Hypernatremia (07 Dec 2024 13:25)      HPI:  89 yo  with PMH of PVD, dysphagia, hypothyroid, CAD, depression. dementia, FTT, on palliative, Comfort care, had viral gastroenteritis in SNF and N/V for a few days which resolved, her lab showed severe hyper natremia and ARF. She was given IVF in SNF and one dose of IV Ceftriaxone but her hyper natremia not improved. I dscussed with her family and offered keep her as comfort  care but they asked for non invasive treatment to send to hospital.   (06 Dec 2024 21:28)  now pulm called as pt became wheezy tachynoeic and a little sob hence was ann marie to hospital:  pt isnon verbal : alert and awke   she is on room air at this time:         ?FOLLOWING PRESENT  [x ] Hx of PE/DVT, [ x] Hx COPD, [x ] Hx of Asthma, [ y] Hx of Hospitalization, [x ]  Hx of BiPAP/CPAP use, [x ] Hx of ANTIONETTE    Allergies    No Known Allergies    Intolerances        PAST MEDICAL & SURGICAL HISTORY:  Dementia      Hypothyroid      Major depression      Osteoarthritis      No pertinent past medical history      No significant past surgical history      No significant past surgical history          FAMILY HISTORY:      Social History: [unk  ] TOBACCO                  [ unk  ] ETOH                                 [  ]unk  IVDA/DRUGS    REVIEW OF SYSTEMS      General:	x    Skin/Breast:x  	  Ophthalmologic:x  	  ENMT:	x    Respiratory and Thorax:  sob,  wheezing  	  Cardiovascular:	x    Gastrointestinal:	x    Genitourinary:	x    Musculoskeletal:	x    Neurological:	x    Psychiatric:	x    Hematology/Lymphatics:	x    Endocrine:	x    Allergic/Immunologic:	x    MEDICATIONS  (STANDING):  aspirin  chewable 81 milliGRAM(s) Oral daily  heparin   Injectable 5000 Unit(s) SubCutaneous every 12 hours  levothyroxine 75 MICROGram(s) Oral daily  mirtazapine 7.5 milliGRAM(s) Oral at bedtime  piperacillin/tazobactam IVPB.- 3.375 Gram(s) IV Intermittent once  senna 2 Tablet(s) Oral at bedtime    MEDICATIONS  (PRN):       Vital Signs Last 24 Hrs  T(C): 37.6 (07 Dec 2024 11:28), Max: 38.4 (07 Dec 2024 09:16)  T(F): 99.6 (07 Dec 2024 11:28), Max: 101.2 (07 Dec 2024 09:16)  HR: 108 (07 Dec 2024 09:16) (87 - 108)  BP: 100/57 (07 Dec 2024 09:16) (98/60 - 107/98)  BP(mean): --  RR: 24 (07 Dec 2024 09:16) (18 - 24)  SpO2: 97% (07 Dec 2024 09:16) (96% - 97%)    Parameters below as of 07 Dec 2024 09:16  Patient On (Oxygen Delivery Method): room air    Orthostatic VS          I&O's Summary    06 Dec 2024 07:01  -  07 Dec 2024 07:00  --------------------------------------------------------  IN: 0 mL / OUT: 50 mL / NET: -50 mL    07 Dec 2024 07:01  -  07 Dec 2024 14:29  --------------------------------------------------------  IN: 285 mL / OUT: 0 mL / NET: 285 mL        Physical Exam:   GENERAL: NAD, well-groomed, well-developed  HEENT: NAYELI/   Atraumatic, Normocephalic  ENMT: No tonsillar erythema, exudates, or enlargement; Moist mucous membranes, Good dentition, No lesions  NECK: Supple, No JVD, Normal thyroid  CHEST/LUNG: mild exp wheezing+  CVS: Regular rate and rhythm; No murmurs, rubs, or gallops  GI: : Soft, Nontender, Nondistended; Bowel sounds present  NERVOUS SYSTEM:  Alert & awake and demented  EXTREMITIES: - edema  LYMPH: No lymphadenopathy noted  SKIN: No rashes or lesions  ENDOCRINOLOGY: No Thyromegaly  PSYCH: demented    Labs:  Venous<47<4>>33<<7.075>>Venous<<3><<4><<5<<339>>, Venous<38<4>>40<<7.155>>Venous<<3><<4><<5<<409>>                            7.7    12.05 )-----------( 225      ( 07 Dec 2024 07:27 )             27.0                         9.0    14.65 )-----------( 247      ( 06 Dec 2024 11:54 )             32.1     12-07    156[H]  |  118[H]  |  109[H]  ----------------------------<  117[H]  3.7   |  17[L]  |  2.83[H]  12-06    156[H]  |  125[H]  |  106[H]  ----------------------------<  88  4.2   |  10[LL]  |  3.04[H]  12-06    155[H]  |  123[H]  |  113[H]  ----------------------------<  134[H]  3.5   |  12[L]  |  3.32[H]    Ca    6.8[L]      07 Dec 2024 07:27  Ca    7.4[L]      06 Dec 2024 21:04  Ca    7.2[L]      06 Dec 2024 11:54    TPro  7.6  /  Alb  2.7[L]  /  TBili  0.5  /  DBili  x   /  AST  72[H]  /  ALT  71[H]  /  AlkPhos  346[H]  12-06    CAPILLARY BLOOD GLUCOSE        LIVER FUNCTIONS - ( 06 Dec 2024 11:54 )  Alb: 2.7 g/dL / Pro: 7.6 g/dL / ALK PHOS: 346 U/L / ALT: 71 U/L / AST: 72 U/L / GGT: x           PT/INR - ( 06 Dec 2024 11:54 )   PT: 12.4 sec;   INR: 1.09 ratio         PTT - ( 06 Dec 2024 11:54 )  PTT:33.9 sec  Urinalysis Basic - ( 07 Dec 2024 07:27 )    Color: x / Appearance: x / SG: x / pH: x  Gluc: 117 mg/dL / Ketone: x  / Bili: x / Urobili: x   Blood: x / Protein: x / Nitrite: x   Leuk Esterase: x / RBC: x / WBC x   Sq Epi: x / Non Sq Epi: x / Bacteria: x      D DImerLactate, Blood: 2.2 mmol/L (12-06 @ 17:28)        Studies  Chest X-RAY  CT SCAN Chest   CT Abdomen  Venous Dopplers: LE:   Others      rad< from: Xray Chest 1 View- PORTABLE-Urgent (12.07.24 @ 11:24) >  CLINICAL INDICATION: Sepsis    TECHNIQUE: Single frontal, portable view of thechest was obtained.    COMPARISON: Chest x-ray 3/28/2019    FINDINGS:  The right lung apex is obscured by the patient's chin.  The heart is normal in size.  No focal consolidations.  There is no pneumothorax or pleural effusion.  No acute osseous abnormalities.  Elevated right hemidiaphragm.    IMPRESSION:  No focal consolidations.    --- End of Report ---          BEATRIZ MADSEN DO; Resident Radiologist  This document has been electronically signed.  MARITZA SHARMA MD; Attending Radiologist  Thisdocument has been electronically signed. Dec  7 2024 12:24PM    < end of copied text >

## 2024-12-07 NOTE — PROGRESS NOTE ADULT - ATTENDING COMMENTS
Leti Stephens MD  Off: [phone]  contact me on teams    (After 5 pm or on weekends please page the on-call fellow/attending, can check [email or web address] for schedule. Login is mora brown, schedule under [facility] medicine, psych, derm)

## 2024-12-07 NOTE — PROGRESS NOTE ADULT - PROBLEM SELECTOR PLAN 1
Pt with HUMAIRA on CKD likely pre-renal in setting of dehydration, anemia. Per HIE review, pt has baseline Scr of 1.3-1.7 in 2024. She had lab work done on 12/4 showing Scr of 2.5 that increased on admission to 3.3. UA showed 100 protein, trace blood, 24 wbc's. Check urine electrolytes, spot urine TP/CR and renal US. Per chart, ER did bladder US showed 100cc in bladder. Pt also noted to have hgb level of 11.6 in 07/2024 that has been downtrending. Monitor labs and urine output. Avoid NSAIDs, ACEI/ARBS, RCA and nephrotoxins. Dose medications as per eGFR.    -Cw IVF -currently on bicarb gtt; obtain gas and will possibly have to change to LR or half NS given relative hypotension and persistent hypernatremia.

## 2024-12-07 NOTE — CONSULT NOTE ADULT - ASSESSMENT
89 yo  with PMH of PVD, dysphagia, hypothyroid, CAD, depression. dementia, FTT, on palliative, Comfort care, had viral gastroenteritis in SNF and N/V for a few days which resolved, her lab showed severe hyper natremia and ARF. She was given IVF in SNF and one dose of IV Ceftriaxone but her hyper natremia not improved. I dscussed with her family and offered keep her as comfort  care but they asked for non invasive treatment to send to hospital.   (06 Dec 2024 21:28)  now pulm called as pt became wheezy tachynoeic and a little sob hence was ann marie to hospital:  pt isnon verbal : alert and awke   she is on room air at this time:       Wheezing+/ Mild SOB   Renal failure  Hypothyroidism :   dementia   PVD      Wheezing+/ Mild SOB   -she was stated on hco3 drip last night as she was very acidotic: ;  -renal following:   -cont BD   ATC  -start pulmicort:   -hold onto on steroids for now:   -her WBC is high ; she is on empiric antibiotics : bne careful as zosyn has na load      Renal failure  -still acidotic:  -renal following:     Hypothyroidism :   -cont  levothyroxine    dementia   -supportive care    PVD  -per pr imary team :     pt is DNR and dni:   not doing well : pretty acidotic:      dw acp

## 2024-12-08 LAB
ANION GAP SERPL CALC-SCNC: 17 MMOL/L — SIGNIFICANT CHANGE UP (ref 5–17)
ANION GAP SERPL CALC-SCNC: 17 MMOL/L — SIGNIFICANT CHANGE UP (ref 5–17)
BUN SERPL-MCNC: 86 MG/DL — HIGH (ref 7–23)
BUN SERPL-MCNC: 89 MG/DL — HIGH (ref 7–23)
CALCIUM SERPL-MCNC: 6.4 MG/DL — CRITICAL LOW (ref 8.4–10.5)
CALCIUM SERPL-MCNC: 6.8 MG/DL — LOW (ref 8.4–10.5)
CHLORIDE SERPL-SCNC: 116 MMOL/L — HIGH (ref 96–108)
CHLORIDE SERPL-SCNC: 117 MMOL/L — HIGH (ref 96–108)
CO2 SERPL-SCNC: 20 MMOL/L — LOW (ref 22–31)
CO2 SERPL-SCNC: 21 MMOL/L — LOW (ref 22–31)
CREAT SERPL-MCNC: 2.8 MG/DL — HIGH (ref 0.5–1.3)
CREAT SERPL-MCNC: 2.83 MG/DL — HIGH (ref 0.5–1.3)
EGFR: 15 ML/MIN/1.73M2 — LOW
EGFR: 15 ML/MIN/1.73M2 — LOW
GAS PNL BLDV: SIGNIFICANT CHANGE UP
GLUCOSE SERPL-MCNC: 67 MG/DL — LOW (ref 70–99)
GLUCOSE SERPL-MCNC: 71 MG/DL — SIGNIFICANT CHANGE UP (ref 70–99)
HCT VFR BLD CALC: 25.6 % — LOW (ref 34.5–45)
HGB BLD-MCNC: 7.3 G/DL — LOW (ref 11.5–15.5)
MCHC RBC-ENTMCNC: 28.5 G/DL — LOW (ref 32–36)
MCHC RBC-ENTMCNC: 29.1 PG — SIGNIFICANT CHANGE UP (ref 27–34)
MCV RBC AUTO: 102 FL — HIGH (ref 80–100)
NRBC # BLD: 0 /100 WBCS — SIGNIFICANT CHANGE UP (ref 0–0)
PLATELET # BLD AUTO: 215 K/UL — SIGNIFICANT CHANGE UP (ref 150–400)
POTASSIUM SERPL-MCNC: 3.5 MMOL/L — SIGNIFICANT CHANGE UP (ref 3.5–5.3)
POTASSIUM SERPL-MCNC: 3.9 MMOL/L — SIGNIFICANT CHANGE UP (ref 3.5–5.3)
POTASSIUM SERPL-SCNC: 3.5 MMOL/L — SIGNIFICANT CHANGE UP (ref 3.5–5.3)
POTASSIUM SERPL-SCNC: 3.9 MMOL/L — SIGNIFICANT CHANGE UP (ref 3.5–5.3)
RBC # BLD: 2.51 M/UL — LOW (ref 3.8–5.2)
RBC # FLD: 15.1 % — HIGH (ref 10.3–14.5)
SODIUM SERPL-SCNC: 154 MMOL/L — HIGH (ref 135–145)
SODIUM SERPL-SCNC: 154 MMOL/L — HIGH (ref 135–145)
WBC # BLD: 10.32 K/UL — SIGNIFICANT CHANGE UP (ref 3.8–10.5)
WBC # FLD AUTO: 10.32 K/UL — SIGNIFICANT CHANGE UP (ref 3.8–10.5)

## 2024-12-08 PROCEDURE — 99254 IP/OBS CNSLTJ NEW/EST MOD 60: CPT

## 2024-12-08 RX ORDER — 0.9 % SODIUM CHLORIDE 0.9 %
500 INTRAVENOUS SOLUTION INTRAVENOUS ONCE
Refills: 0 | Status: COMPLETED | OUTPATIENT
Start: 2024-12-08 | End: 2024-12-08

## 2024-12-08 RX ORDER — 0.9 % SODIUM CHLORIDE 0.9 %
1000 INTRAVENOUS SOLUTION INTRAVENOUS
Refills: 0 | Status: DISCONTINUED | OUTPATIENT
Start: 2024-12-08 | End: 2024-12-10

## 2024-12-08 RX ORDER — ACETAMINOPHEN 500MG 500 MG/1
650 TABLET, COATED ORAL ONCE
Refills: 0 | Status: COMPLETED | OUTPATIENT
Start: 2024-12-08 | End: 2024-12-08

## 2024-12-08 RX ADMIN — Medication 100 GRAM(S): at 10:47

## 2024-12-08 RX ADMIN — Medication 75 MILLILITER(S): at 16:00

## 2024-12-08 RX ADMIN — ACETAMINOPHEN 500MG 260 MILLIGRAM(S): 500 TABLET, COATED ORAL at 09:12

## 2024-12-08 RX ADMIN — Medication 5000 UNIT(S): at 06:15

## 2024-12-08 RX ADMIN — IPRATROPIUM BROMIDE AND ALBUTEROL SULFATE 3 MILLILITER(S): 2.5; .5 SOLUTION RESPIRATORY (INHALATION) at 11:47

## 2024-12-08 RX ADMIN — IPRATROPIUM BROMIDE AND ALBUTEROL SULFATE 3 MILLILITER(S): 2.5; .5 SOLUTION RESPIRATORY (INHALATION) at 06:15

## 2024-12-08 RX ADMIN — BUDESONIDE 0.5 MILLIGRAM(S): 0.25 SUSPENSION RESPIRATORY (INHALATION) at 06:16

## 2024-12-08 RX ADMIN — IPRATROPIUM BROMIDE AND ALBUTEROL SULFATE 3 MILLILITER(S): 2.5; .5 SOLUTION RESPIRATORY (INHALATION) at 17:36

## 2024-12-08 RX ADMIN — Medication 75 MICROGRAM(S): at 07:15

## 2024-12-08 RX ADMIN — Medication 250 MILLILITER(S): at 00:40

## 2024-12-08 RX ADMIN — IPRATROPIUM BROMIDE AND ALBUTEROL SULFATE 3 MILLILITER(S): 2.5; .5 SOLUTION RESPIRATORY (INHALATION) at 00:01

## 2024-12-08 RX ADMIN — PIPERACILLIN SODIUM AND TAZOBACTAM SODIUM 25 GRAM(S): 4; .5 INJECTION, POWDER, LYOPHILIZED, FOR SOLUTION INTRAVENOUS at 06:15

## 2024-12-08 RX ADMIN — ACETAMINOPHEN 500MG 650 MILLIGRAM(S): 500 TABLET, COATED ORAL at 10:30

## 2024-12-08 RX ADMIN — BUDESONIDE 0.5 MILLIGRAM(S): 0.25 SUSPENSION RESPIRATORY (INHALATION) at 17:36

## 2024-12-08 RX ADMIN — PIPERACILLIN SODIUM AND TAZOBACTAM SODIUM 25 GRAM(S): 4; .5 INJECTION, POWDER, LYOPHILIZED, FOR SOLUTION INTRAVENOUS at 17:36

## 2024-12-08 RX ADMIN — Medication 5000 UNIT(S): at 17:36

## 2024-12-08 NOTE — DIETITIAN INITIAL EVALUATION ADULT - PERTINENT LABORATORY DATA
Pharmacy made aware that son is here to go over PTA meds   12-08    154[H]  |  116[H]  |  89[H]  ----------------------------<  71  3.5   |  21[L]  |  2.80[H]    Ca    6.4[LL]      08 Dec 2024 09:32    TPro  7.6  /  Alb  2.7[L]  /  TBili  0.5  /  DBili  x   /  AST  72[H]  /  ALT  71[H]  /  AlkPhos  346[H]  12-06

## 2024-12-08 NOTE — DIETITIAN INITIAL EVALUATION ADULT - PERTINENT MEDS FT
MEDICATIONS  (STANDING):  albuterol/ipratropium for Nebulization 3 milliLiter(s) Nebulizer every 6 hours  aspirin  chewable 81 milliGRAM(s) Oral daily  buDESOnide    Inhalation Suspension 0.5 milliGRAM(s) Inhalation every 12 hours  heparin   Injectable 5000 Unit(s) SubCutaneous every 12 hours  lactated ringers. 1000 milliLiter(s) (75 mL/Hr) IV Continuous <Continuous>  levothyroxine 75 MICROGram(s) Oral daily  mirtazapine 7.5 milliGRAM(s) Oral at bedtime  piperacillin/tazobactam IVPB.. 3.375 Gram(s) IV Intermittent every 12 hours  senna 2 Tablet(s) Oral at bedtime  sodium chloride 0.45%. 250 milliLiter(s) (125 mL/Hr) IV Continuous <Continuous>    MEDICATIONS  (PRN):

## 2024-12-08 NOTE — DIETITIAN INITIAL EVALUATION ADULT - ADD RECOMMEND
1) Defer diet texture/consistency to team/ Speech Language Pathologist 2) RD to add Mighty Shakes 3x daily to supplement PO intake. 3) Encourage PO intake as able. 4) Monitor GOC, nutrition interventions to be inline with GOC.

## 2024-12-08 NOTE — CONSULT NOTE ADULT - NS ATTEND AMEND GEN_ALL_CORE FT
89-yo F w/ PMH of PVD, CAD, dementia, and FTT, previously on comfort care, sent in for electrolyte derangements, found to have leukocytosis, fever, and bacteriuria. No BCx positivity but febrile to 101.1. WBC improving on antibiotics. Grossly unclear infection status. Would recommend imaging to assess for infectious foci.    Cultures:  12/6 UCx E coli, E faecalis  12/6 BCx NGTD    Antimicrobials:  Ceftriaxone 12/6  Zosyn 12/7-    #Bacteriuria  #Fever  #Electrolyte derangement  #FTT  #Debility  #At risk for aspiration    Recommendations:  - Continue with Zosyn 3.375 g IV Q12H.  - Repeat BCx x2 sets  - Renal US vs CT scan. Patient has HUMAIRA, and the use of contrast can be limited.  - F/u VS and WBC    Plan discussed with primary team ACP.  Thank you for this consult. Inpatient ID team will follow.    Margarito Oliver MD, PhD  Attending Physician  Division of Infectious Diseases  Department of Medicine    Please contact through MS Teams message.  Office: [phone] (after 5 PM or weekend)

## 2024-12-08 NOTE — DIETITIAN INITIAL EVALUATION ADULT - ORAL INTAKE PTA/DIET HISTORY
Pt with dementia, no family at bedside. Information obtained from transfer records and chart review as able. Unable to assess PO diet or adequacy of PO intake PTA.  Unable to assess if patient with chewing/swallowing difficulty, constipation PTA however per chart pt with history of dysphagia. Noted with nausea, emesis x few days at SNF due to viral gastroenteritis however resolved.

## 2024-12-08 NOTE — DIETITIAN INITIAL EVALUATION ADULT - NSFNSGIIOFT_GEN_A_CORE
Type 2 diabetes control is worsening and is now above goal.  A1c 7.2%.  Worsening microalbuminuria.  Lipids within goal.  TSH well-controlled and within goal.  I recommended adding Farxiga 5 mg daily along with healthier eating, losing weight to going for daily walk.     last bowel movement 12/8 (3x)

## 2024-12-08 NOTE — DIETITIAN INITIAL EVALUATION ADULT - OTHER INFO
Weight: Current dosing weight is 94.8lbs. Bed weight obtained by RD noted as 104lbs--likely inaccurate. No additional weights to assess.

## 2024-12-08 NOTE — DIETITIAN INITIAL EVALUATION ADULT - REASON FOR ADMISSION
Renal failure    Chart reviewed, events noted. This is a "89 yo  with PMH of PVD, dysphagia, hypothyroid, CAD, depression. dementia, FTT, on palliative, Comfort care, had viral gastroenteritis in SNF and N/V for a few days which resolved, her lab showed severe hyper natremia and ARF. She was given IVF in SNF and one dose of IV Ceftriaxone but her hyper natremia not improved. I dscussed with her family and offered keep her as comfort  care but they asked for non invasive treatment to send to hospital. "

## 2024-12-08 NOTE — DIETITIAN INITIAL EVALUATION ADULT - ENERGY INTAKE
Poor (<50%) In-house pt on pureed diet, observed breakfast tray largely untouched. Speech Language Pathologist consulted placed. Ongoing GOC discussion with family. On comfort care at facility.

## 2024-12-08 NOTE — CONSULT NOTE ADULT - SUBJECTIVE AND OBJECTIVE BOX
Patient is a 89y old  Female who presents with a chief complaint of Hypernatremia (08 Dec 2024 09:02)    HPI:  89 yo  with PMH of PVD, dysphagia, hypothyroid, CAD, depression. dementia, FTT, on palliative, Comfort care, had viral gastroenteritis in SNF and N/V for a few days which resolved, her lab showed severe hyper natremia and ARF. She was given IVF in SNF and one dose of IV Ceftriaxone but her hyper natremia not improved. I dscussed with her family and offered keep her as comfort  care but they asked for non invasive treatment to send to hospital.   (06 Dec 2024 21:28)     REVIEW OF SYSTEMS  [  ] ROS unobtainable because:    [ x ] All other systems negative except as noted below  Constitutional:  [ ] fever [ ] chills  [ ] weight loss  [ ]night sweat  [ ]poor appetite/PO intake [ ]fatigue   Skin:  [ ] rash [ ] phlebitis	  Eyes: [ ] icterus [ ] pain  [ ] discharge	  ENMT: [ ] sore throat  [ ] thrush [ ] ulcers [ ] exudates [ ]anosmia  Respiratory: [ ] dyspnea [ ] hemoptysis [ ] cough [ ] sputum	  Cardiovascular:  [ ] chest pain [ ] palpitations [ ] edema	  Gastrointestinal:  [ ] nausea [ ] vomiting [ ] diarrhea [ ] constipation [ ] pain	  Genitourinary:  [ ] dysuria [ ] frequency [ ] hematuria [ ] discharge [ ] flank pain  [ ] incontinence  Musculoskeletal:  [ ] myalgias [ ] arthralgias [ ] arthritis  [ ] back pain  Neurological:  [ ] headache [ ] weakness [ ] seizures  [ ] confusion/altered mental status  prior hospital charts reviewed [V]  primary team notes reviewed [V]  other consultant notes reviewed [V]    PAST MEDICAL & SURGICAL HISTORY:  Dementia      Hypothyroid      Major depression      Osteoarthritis      No pertinent past medical history      No significant past surgical history      No significant past surgical history          SOCIAL HISTORY:  - Denied smoking/vaping/alcohol/recreational drug use    FAMILY HISTORY:      Allergies  No Known Allergies        ANTIMICROBIALS:  piperacillin/tazobactam IVPB.. 3.375 every 12 hours      ANTIMICROBIALS (past 90 days):  MEDICATIONS  (STANDING):  cefTRIAXone   IVPB   100 mL/Hr IV Intermittent (12-06-24 @ 14:28)    piperacillin/tazobactam IVPB.   200 mL/Hr IV Intermittent (12-07-24 @ 13:25)    piperacillin/tazobactam IVPB.-   25 mL/Hr IV Intermittent (12-07-24 @ 21:30)    piperacillin/tazobactam IVPB..   25 mL/Hr IV Intermittent (12-08-24 @ 06:15)        OTHER MEDS:   MEDICATIONS  (STANDING):  albuterol/ipratropium for Nebulization 3 every 6 hours  aspirin  chewable 81 daily  buDESOnide    Inhalation Suspension 0.5 every 12 hours  heparin   Injectable 5000 every 12 hours  levothyroxine 75 daily  mirtazapine 7.5 at bedtime  senna 2 at bedtime      VITALS:  Vital Signs Last 24 Hrs  T(F): 101.1 (12-08-24 @ 08:06), Max: 101.2 (12-07-24 @ 09:16)    Vital Signs Last 24 Hrs  HR: 75 (12-08-24 @ 08:06) (75 - 97)  BP: 97/64 (12-08-24 @ 08:06) (64/41 - 111/67)  RR: 20 (12-08-24 @ 08:06)  SpO2: 94% (12-08-24 @ 08:06) (94% - 98%)  Wt(kg): --    EXAM:    GA: NAD, AOx3  HEENT: oral cavity no lesion  CV: nl S1/S2, no RMG  Lungs: CTAB, No distress  Abd: BS+, soft, nontender, no rebounding pain  Ext: no edema  Neuro: No focal deficits  Skin: Intact  IV: no phlebitis  Labs:                        7.3    10.32 )-----------( 215      ( 08 Dec 2024 09:32 )             25.6     12-07    156[H]  |  118[H]  |  109[H]  ----------------------------<  117[H]  3.7   |  17[L]  |  2.83[H]    Ca    6.8[L]      07 Dec 2024 07:27    TPro  7.6  /  Alb  2.7[L]  /  TBili  0.5  /  DBili  x   /  AST  72[H]  /  ALT  71[H]  /  AlkPhos  346[H]  12-06    WBC Trend:  WBC Count: 10.32 (12-08-24 @ 09:32)  WBC Count: 12.05 (12-07-24 @ 07:27)  WBC Count: 14.65 (12-06-24 @ 11:54)    Auto Neutrophil #: 9.72 K/uL (12-06-24 @ 11:54)    Creatine Trend:  Creatinine: 2.83 (12-07)  Creatinine: 3.04 (12-06)  Creatinine: 3.32 (12-06)    Liver Biochemical Testing Trend:  Alanine Aminotransferase (ALT/SGPT): 71 *H* (12-06)  Aspartate Aminotransferase (AST/SGOT): 72 (12-06-24 @ 11:54)  Bilirubin Total: 0.5 (12-06)    Trend LDH    Auto Eosinophil %: 9.2 % (12-06-24 @ 11:54)    Urinalysis Basic - ( 07 Dec 2024 07:27 )    Color: x / Appearance: x / SG: x / pH: x  Gluc: 117 mg/dL / Ketone: x  / Bili: x / Urobili: x   Blood: x / Protein: x / Nitrite: x   Leuk Esterase: x / RBC: x / WBC x   Sq Epi: x / Non Sq Epi: x / Bacteria: x      MICROBIOLOGY:        Culture - Blood (collected 06 Dec 2024 11:15)  Source: .Blood BLOOD  Preliminary Report:    No growth at 24 hours    Culture - Blood (collected 06 Dec 2024 11:00)  Source: .Blood BLOOD  Preliminary Report:    No growth at 24 hours    Troponin T, High Sensitivity Result: 97 (12-06)  Troponin T, High Sensitivity Result: 129 (12-06)    Blood Gas Venous - Lactate: 1.9 (12-07 @ 15:05)  Blood Gas Venous - Lactate: 3.8 (12-06 @ 21:05)  Lactate, Blood: 2.2 (12-06 @ 17:28)  Blood Gas Venous - Lactate: 2.2 (12-06 @ 11:51)        CSF:    RADIOLOGY:  < from: Xray Chest 1 View- PORTABLE-Urgent (12.07.24 @ 11:24) >    IMPRESSION:  No focal consolidations.    < end of copied text >   Patient is a 89y old  Female who presents with a chief complaint of Hypernatremia (08 Dec 2024 09:02)    HPI:  89 yo  with PMH of PVD, dysphagia, hypothyroid, CAD, depression. dementia, FTT, on palliative, Comfort care, had viral gastroenteritis in SNF and N/V for a few days which resolved, her lab showed severe hyper natremia and ARF. She was given IVF in SNF and one dose of IV Ceftriaxone but her hyper natremia not improved. I dscussed with her family and offered keep her as comfort  care but they asked for non invasive treatment to send to hospital.   (06 Dec 2024 21:28)     REVIEW OF SYSTEMS  [ x ] ROS unobtainable because:  nonverbal  [  ] All other systems negative except as noted below  Constitutional:  [ ] fever [ ] chills  [ ] weight loss  [ ]night sweat  [ ]poor appetite/PO intake [ ]fatigue   Skin:  [ ] rash [ ] phlebitis	  Eyes: [ ] icterus [ ] pain  [ ] discharge	  ENMT: [ ] sore throat  [ ] thrush [ ] ulcers [ ] exudates [ ]anosmia  Respiratory: [ ] dyspnea [ ] hemoptysis [ ] cough [ ] sputum	  Cardiovascular:  [ ] chest pain [ ] palpitations [ ] edema	  Gastrointestinal:  [ ] nausea [ ] vomiting [ ] diarrhea [ ] constipation [ ] pain	  Genitourinary:  [ ] dysuria [ ] frequency [ ] hematuria [ ] discharge [ ] flank pain  [ ] incontinence  Musculoskeletal:  [ ] myalgias [ ] arthralgias [ ] arthritis  [ ] back pain  Neurological:  [ ] headache [ ] weakness [ ] seizures  [ ] confusion/altered mental status  prior hospital charts reviewed [V]  primary team notes reviewed [V]  other consultant notes reviewed [V]    PAST MEDICAL & SURGICAL HISTORY:  Dementia      Hypothyroid      Major depression      Osteoarthritis      No pertinent past medical history      No significant past surgical history      No significant past surgical history          SOCIAL HISTORY:  - Denied smoking/vaping/alcohol/recreational drug use    FAMILY HISTORY:      Allergies  No Known Allergies        ANTIMICROBIALS:  piperacillin/tazobactam IVPB.. 3.375 every 12 hours      ANTIMICROBIALS (past 90 days):  MEDICATIONS  (STANDING):  cefTRIAXone   IVPB   100 mL/Hr IV Intermittent (12-06-24 @ 14:28)    piperacillin/tazobactam IVPB.   200 mL/Hr IV Intermittent (12-07-24 @ 13:25)    piperacillin/tazobactam IVPB.-   25 mL/Hr IV Intermittent (12-07-24 @ 21:30)    piperacillin/tazobactam IVPB..   25 mL/Hr IV Intermittent (12-08-24 @ 06:15)        OTHER MEDS:   MEDICATIONS  (STANDING):  albuterol/ipratropium for Nebulization 3 every 6 hours  aspirin  chewable 81 daily  buDESOnide    Inhalation Suspension 0.5 every 12 hours  heparin   Injectable 5000 every 12 hours  levothyroxine 75 daily  mirtazapine 7.5 at bedtime  senna 2 at bedtime      VITALS:  Vital Signs Last 24 Hrs  T(F): 101.1 (12-08-24 @ 08:06), Max: 101.2 (12-07-24 @ 09:16)    Vital Signs Last 24 Hrs  HR: 75 (12-08-24 @ 08:06) (75 - 97)  BP: 97/64 (12-08-24 @ 08:06) (64/41 - 111/67)  RR: 20 (12-08-24 @ 08:06)  SpO2: 94% (12-08-24 @ 08:06) (94% - 98%)  Wt(kg): --    EXAM:    GA: NAD,   HEENT: oral cavity no lesion  CV: nl S1/S2, no RMG  Lungs: CTAB, No distress  Abd: BS+, soft, nontender, no rebounding pain  Ext: no edema  Neuro: No focal deficits  Skin: Intact  IV: no phlebitis  Labs:                        7.3    10.32 )-----------( 215      ( 08 Dec 2024 09:32 )             25.6     12-07    156[H]  |  118[H]  |  109[H]  ----------------------------<  117[H]  3.7   |  17[L]  |  2.83[H]    Ca    6.8[L]      07 Dec 2024 07:27    TPro  7.6  /  Alb  2.7[L]  /  TBili  0.5  /  DBili  x   /  AST  72[H]  /  ALT  71[H]  /  AlkPhos  346[H]  12-06    WBC Trend:  WBC Count: 10.32 (12-08-24 @ 09:32)  WBC Count: 12.05 (12-07-24 @ 07:27)  WBC Count: 14.65 (12-06-24 @ 11:54)    Auto Neutrophil #: 9.72 K/uL (12-06-24 @ 11:54)    Creatine Trend:  Creatinine: 2.83 (12-07)  Creatinine: 3.04 (12-06)  Creatinine: 3.32 (12-06)    Liver Biochemical Testing Trend:  Alanine Aminotransferase (ALT/SGPT): 71 *H* (12-06)  Aspartate Aminotransferase (AST/SGOT): 72 (12-06-24 @ 11:54)  Bilirubin Total: 0.5 (12-06)    Trend LDH    Auto Eosinophil %: 9.2 % (12-06-24 @ 11:54)    Urinalysis Basic - ( 07 Dec 2024 07:27 )    Color: x / Appearance: x / SG: x / pH: x  Gluc: 117 mg/dL / Ketone: x  / Bili: x / Urobili: x   Blood: x / Protein: x / Nitrite: x   Leuk Esterase: x / RBC: x / WBC x   Sq Epi: x / Non Sq Epi: x / Bacteria: x      MICROBIOLOGY:        Culture - Blood (collected 06 Dec 2024 11:15)  Source: .Blood BLOOD  Preliminary Report:    No growth at 24 hours    Culture - Blood (collected 06 Dec 2024 11:00)  Source: .Blood BLOOD  Preliminary Report:    No growth at 24 hours    Troponin T, High Sensitivity Result: 97 (12-06)  Troponin T, High Sensitivity Result: 129 (12-06)    Blood Gas Venous - Lactate: 1.9 (12-07 @ 15:05)  Blood Gas Venous - Lactate: 3.8 (12-06 @ 21:05)  Lactate, Blood: 2.2 (12-06 @ 17:28)  Blood Gas Venous - Lactate: 2.2 (12-06 @ 11:51)        CSF:    RADIOLOGY:  < from: Xray Chest 1 View- PORTABLE-Urgent (12.07.24 @ 11:24) >    IMPRESSION:  No focal consolidations.    < end of copied text >   Patient is a 89y old  Female who presents with a chief complaint of Hypernatremia (08 Dec 2024 09:02)    HPI:  89 yo  with PMH of PVD, dysphagia, hypothyroid, CAD, depression. dementia, FTT, on palliative, Comfort care, had viral gastroenteritis in SNF and N/V for a few days which resolved, her lab showed severe hyper natremia and ARF. She was given IVF in SNF and one dose of IV Ceftriaxone but her hyper natremia not improved. I dscussed with her family and offered keep her as comfort  care but they asked for non invasive treatment to send to hospital.   (06 Dec 2024 21:28)     REVIEW OF SYSTEMS  [ x ] ROS unobtainable because:  nonverbal  [  ] All other systems negative except as noted below  Constitutional:  [ ] fever [ ] chills  [ ] weight loss  [ ]night sweat  [ ]poor appetite/PO intake [ ]fatigue   Skin:  [ ] rash [ ] phlebitis	  Eyes: [ ] icterus [ ] pain  [ ] discharge	  ENMT: [ ] sore throat  [ ] thrush [ ] ulcers [ ] exudates [ ]anosmia  Respiratory: [ ] dyspnea [ ] hemoptysis [ ] cough [ ] sputum	  Cardiovascular:  [ ] chest pain [ ] palpitations [ ] edema	  Gastrointestinal:  [ ] nausea [ ] vomiting [ ] diarrhea [ ] constipation [ ] pain	  Genitourinary:  [ ] dysuria [ ] frequency [ ] hematuria [ ] discharge [ ] flank pain  [ ] incontinence  Musculoskeletal:  [ ] myalgias [ ] arthralgias [ ] arthritis  [ ] back pain  Neurological:  [ ] headache [ ] weakness [ ] seizures  [ ] confusion/altered mental status  prior hospital charts reviewed [V]  primary team notes reviewed [V]  other consultant notes reviewed [V]    PAST MEDICAL & SURGICAL HISTORY:  Dementia      Hypothyroid      Major depression      Osteoarthritis      No pertinent past medical history      No significant past surgical history      No significant past surgical history          SOCIAL HISTORY:  No known tobacco or illicit drug history    FAMILY HISTORY:  No known family history of sepsis    Allergies  No Known Allergies        ANTIMICROBIALS:  piperacillin/tazobactam IVPB.. 3.375 every 12 hours      ANTIMICROBIALS (past 90 days):  MEDICATIONS  (STANDING):  cefTRIAXone   IVPB   100 mL/Hr IV Intermittent (12-06-24 @ 14:28)    piperacillin/tazobactam IVPB.   200 mL/Hr IV Intermittent (12-07-24 @ 13:25)    piperacillin/tazobactam IVPB.-   25 mL/Hr IV Intermittent (12-07-24 @ 21:30)    piperacillin/tazobactam IVPB..   25 mL/Hr IV Intermittent (12-08-24 @ 06:15)        OTHER MEDS:   MEDICATIONS  (STANDING):  albuterol/ipratropium for Nebulization 3 every 6 hours  aspirin  chewable 81 daily  buDESOnide    Inhalation Suspension 0.5 every 12 hours  heparin   Injectable 5000 every 12 hours  levothyroxine 75 daily  mirtazapine 7.5 at bedtime  senna 2 at bedtime      VITALS:  Vital Signs Last 24 Hrs  T(F): 101.1 (12-08-24 @ 08:06), Max: 101.2 (12-07-24 @ 09:16)    Vital Signs Last 24 Hrs  HR: 75 (12-08-24 @ 08:06) (75 - 97)  BP: 97/64 (12-08-24 @ 08:06) (64/41 - 111/67)  RR: 20 (12-08-24 @ 08:06)  SpO2: 94% (12-08-24 @ 08:06) (94% - 98%)  Wt(kg): --    EXAM:    GA: NAD,   HEENT: oral cavity no lesion  CV: nl S1/S2, no RMG  Lungs: CTAB, No distress  Abd: BS+, soft, nontender, no rebounding pain  Ext: no edema  Neuro: No focal deficits  Skin: Intact  IV: no phlebitis  Labs:                        7.3    10.32 )-----------( 215      ( 08 Dec 2024 09:32 )             25.6     12-07    156[H]  |  118[H]  |  109[H]  ----------------------------<  117[H]  3.7   |  17[L]  |  2.83[H]    Ca    6.8[L]      07 Dec 2024 07:27    TPro  7.6  /  Alb  2.7[L]  /  TBili  0.5  /  DBili  x   /  AST  72[H]  /  ALT  71[H]  /  AlkPhos  346[H]  12-06    WBC Trend:  WBC Count: 10.32 (12-08-24 @ 09:32)  WBC Count: 12.05 (12-07-24 @ 07:27)  WBC Count: 14.65 (12-06-24 @ 11:54)    Auto Neutrophil #: 9.72 K/uL (12-06-24 @ 11:54)    Creatine Trend:  Creatinine: 2.83 (12-07)  Creatinine: 3.04 (12-06)  Creatinine: 3.32 (12-06)    Liver Biochemical Testing Trend:  Alanine Aminotransferase (ALT/SGPT): 71 *H* (12-06)  Aspartate Aminotransferase (AST/SGOT): 72 (12-06-24 @ 11:54)  Bilirubin Total: 0.5 (12-06)    Trend LDH    Auto Eosinophil %: 9.2 % (12-06-24 @ 11:54)    Urinalysis Basic - ( 07 Dec 2024 07:27 )    Color: x / Appearance: x / SG: x / pH: x  Gluc: 117 mg/dL / Ketone: x  / Bili: x / Urobili: x   Blood: x / Protein: x / Nitrite: x   Leuk Esterase: x / RBC: x / WBC x   Sq Epi: x / Non Sq Epi: x / Bacteria: x      MICROBIOLOGY:        Culture - Blood (collected 06 Dec 2024 11:15)  Source: .Blood BLOOD  Preliminary Report:    No growth at 24 hours    Culture - Blood (collected 06 Dec 2024 11:00)  Source: .Blood BLOOD  Preliminary Report:    No growth at 24 hours    Troponin T, High Sensitivity Result: 97 (12-06)  Troponin T, High Sensitivity Result: 129 (12-06)    Blood Gas Venous - Lactate: 1.9 (12-07 @ 15:05)  Blood Gas Venous - Lactate: 3.8 (12-06 @ 21:05)  Lactate, Blood: 2.2 (12-06 @ 17:28)  Blood Gas Venous - Lactate: 2.2 (12-06 @ 11:51)        CSF:    RADIOLOGY:  < from: Xray Chest 1 View- PORTABLE-Urgent (12.07.24 @ 11:24) >    IMPRESSION:  No focal consolidations.    < end of copied text >

## 2024-12-08 NOTE — CONSULT NOTE ADULT - ASSESSMENT
89 yo  with PMH of PVD, dysphagia, hypothyroid, CAD, depression. dementia, FTT, on palliative, Comfort care, had recent viral gastroenteritis brought in by EMS from nursing home to the ED for abnormal lab results.  ID consulted for + ua/fevers        101 fevers on 12/7, 8  WBC 17.3 on admission now 10  UA with WBC 24 sm. leuks  Bld cx neg 12/6  #deranged electrolytes  #HUMAIRA  12/6 Ucx -not resulted  12/6 s/p ceftriaxone x 1, currently on Zosyn 12/7-->      Incomplete******************************************* 89 yo  with PMH of PVD, dysphagia, hypothyroid, CAD, depression. dementia, FTT, on palliative, Comfort care, had recent viral gastroenteritis brought in by EMS from nursing home to the ED for abnormal lab results.  ID consulted for + ua/fevers    101 fevers on 12/7, 8  WBC 17.3 on admission now 10  UA with WBC 24 sm. leuks  lactate 2.2 on admission  Bld cx neg 12/6  #deranged electrolytes  #HUMAIRA  12/6 Ucx -not resulted  12/6 s/p ceftriaxone x 1, currently on Zosyn 12/7-->      Incomplete******************************************* 89 yo  with PMH of PVD, dysphagia, hypothyroid, CAD, depression. dementia, FTT, on palliative, Comfort care, had recent viral gastroenteritis brought in by EMS from nursing home to the ED for abnormal lab results.  ID consulted for + ua/fevers    101 fevers on 12/7, 8  WBC 17.3 on admission now 10  UA with WBC 24 sm. leuks  lactate 2.2 on admission  Bld cx neg 12/6  #deranged electrolytes  #HUMAIRA  12/6 Ucx -ecoli and enterococcus species  12/6 s/p ceftriaxone x 1, currently on Zosyn 12/7-->    Plan    Overall pt with recent viral gastroenteritis deranged electrolyte now presenting with concerns of UTI.  Ucx has not resulted as yet and pt unable to answer to symptoms-no known resistance history.  c/w zosyn  Suggest renal US vs non contrast CT a/p r/o ascending infection  Follow up Ucx--ecoli and enterococcus species  Continue to monitor for systemic infective symptoms  Plan d/w Dr. Oliver and floor provider

## 2024-12-09 LAB
-  AMPICILLIN/SULBACTAM: SIGNIFICANT CHANGE UP
-  AMPICILLIN: SIGNIFICANT CHANGE UP
-  AZTREONAM: SIGNIFICANT CHANGE UP
-  CEFAZOLIN: SIGNIFICANT CHANGE UP
-  CEFEPIME: SIGNIFICANT CHANGE UP
-  CEFTRIAXONE: SIGNIFICANT CHANGE UP
-  CEFUROXIME: SIGNIFICANT CHANGE UP
-  CIPROFLOXACIN: SIGNIFICANT CHANGE UP
-  ERTAPENEM: SIGNIFICANT CHANGE UP
-  GENTAMICIN: SIGNIFICANT CHANGE UP
-  IMIPENEM: SIGNIFICANT CHANGE UP
-  LEVOFLOXACIN: SIGNIFICANT CHANGE UP
-  MEROPENEM: SIGNIFICANT CHANGE UP
-  NITROFURANTOIN: SIGNIFICANT CHANGE UP
-  PIPERACILLIN/TAZOBACTAM: SIGNIFICANT CHANGE UP
-  TOBRAMYCIN: SIGNIFICANT CHANGE UP
-  TRIMETHOPRIM/SULFAMETHOXAZOLE: SIGNIFICANT CHANGE UP
ANION GAP SERPL CALC-SCNC: 20 MMOL/L — HIGH (ref 5–17)
BUN SERPL-MCNC: 69 MG/DL — HIGH (ref 7–23)
CALCIUM SERPL-MCNC: 6.8 MG/DL — LOW (ref 8.4–10.5)
CHLORIDE SERPL-SCNC: 116 MMOL/L — HIGH (ref 96–108)
CO2 SERPL-SCNC: 19 MMOL/L — LOW (ref 22–31)
CREAT SERPL-MCNC: 2.52 MG/DL — HIGH (ref 0.5–1.3)
EGFR: 17 ML/MIN/1.73M2 — LOW
GLUCOSE SERPL-MCNC: 59 MG/DL — LOW (ref 70–99)
HCT VFR BLD CALC: 26.9 % — LOW (ref 34.5–45)
HGB BLD-MCNC: 7.7 G/DL — LOW (ref 11.5–15.5)
MCHC RBC-ENTMCNC: 28.6 G/DL — LOW (ref 32–36)
MCHC RBC-ENTMCNC: 29.6 PG — SIGNIFICANT CHANGE UP (ref 27–34)
MCV RBC AUTO: 103.5 FL — HIGH (ref 80–100)
METHOD TYPE: SIGNIFICANT CHANGE UP
NRBC # BLD: 0 /100 WBCS — SIGNIFICANT CHANGE UP (ref 0–0)
PLATELET # BLD AUTO: 231 K/UL — SIGNIFICANT CHANGE UP (ref 150–400)
POTASSIUM SERPL-MCNC: 3.7 MMOL/L — SIGNIFICANT CHANGE UP (ref 3.5–5.3)
POTASSIUM SERPL-SCNC: 3.7 MMOL/L — SIGNIFICANT CHANGE UP (ref 3.5–5.3)
RBC # BLD: 2.6 M/UL — LOW (ref 3.8–5.2)
RBC # FLD: 15.4 % — HIGH (ref 10.3–14.5)
SODIUM SERPL-SCNC: 155 MMOL/L — HIGH (ref 135–145)
WBC # BLD: 10.84 K/UL — HIGH (ref 3.8–10.5)
WBC # FLD AUTO: 10.84 K/UL — HIGH (ref 3.8–10.5)

## 2024-12-09 PROCEDURE — 99232 SBSQ HOSP IP/OBS MODERATE 35: CPT

## 2024-12-09 RX ORDER — BUDESONIDE 0.25 MG/2ML
0.5 SUSPENSION RESPIRATORY (INHALATION)
Qty: 0 | Refills: 0 | DISCHARGE
Start: 2024-12-09

## 2024-12-09 RX ORDER — 0.9 % SODIUM CHLORIDE 0.9 %
1000 INTRAVENOUS SOLUTION INTRAVENOUS
Refills: 0 | Status: DISCONTINUED | OUTPATIENT
Start: 2024-12-09 | End: 2024-12-10

## 2024-12-09 RX ORDER — IPRATROPIUM BROMIDE AND ALBUTEROL SULFATE 2.5; .5 MG/3ML; MG/3ML
3 SOLUTION RESPIRATORY (INHALATION)
Qty: 0 | Refills: 0 | DISCHARGE
Start: 2024-12-09

## 2024-12-09 RX ORDER — ONDANSETRON HYDROCHLORIDE 4 MG/1
1 TABLET, FILM COATED ORAL
Refills: 0 | DISCHARGE

## 2024-12-09 RX ORDER — BUDESONIDE 0.25 MG/2ML
2 SUSPENSION RESPIRATORY (INHALATION)
Qty: 60 | Refills: 0
Start: 2024-12-09 | End: 2025-01-07

## 2024-12-09 RX ORDER — MIRTAZAPINE 15 MG/1
3 TABLET, FILM COATED ORAL
Qty: 0 | Refills: 0 | DISCHARGE

## 2024-12-09 RX ORDER — PIPERACILLIN SODIUM AND TAZOBACTAM SODIUM 4; .5 G/20ML; G/20ML
3.38 INJECTION, POWDER, LYOPHILIZED, FOR SOLUTION INTRAVENOUS
Qty: 14 | Refills: 0
Start: 2024-12-09 | End: 2024-12-15

## 2024-12-09 RX ORDER — PIPERACILLIN SODIUM AND TAZOBACTAM SODIUM 4; .5 G/20ML; G/20ML
0 INJECTION, POWDER, LYOPHILIZED, FOR SOLUTION INTRAVENOUS
Qty: 0 | Refills: 0 | DISCHARGE
Start: 2024-12-09

## 2024-12-09 RX ORDER — MEROPENEM 500 MG/1
500 INJECTION, POWDER, FOR SOLUTION INTRAVENOUS EVERY 24 HOURS
Refills: 0 | Status: DISCONTINUED | OUTPATIENT
Start: 2024-12-09 | End: 2024-12-10

## 2024-12-09 RX ADMIN — BUDESONIDE 0.5 MILLIGRAM(S): 0.25 SUSPENSION RESPIRATORY (INHALATION) at 17:47

## 2024-12-09 RX ADMIN — Medication 5000 UNIT(S): at 05:37

## 2024-12-09 RX ADMIN — IPRATROPIUM BROMIDE AND ALBUTEROL SULFATE 3 MILLILITER(S): 2.5; .5 SOLUTION RESPIRATORY (INHALATION) at 17:47

## 2024-12-09 RX ADMIN — PIPERACILLIN SODIUM AND TAZOBACTAM SODIUM 25 GRAM(S): 4; .5 INJECTION, POWDER, LYOPHILIZED, FOR SOLUTION INTRAVENOUS at 05:37

## 2024-12-09 RX ADMIN — IPRATROPIUM BROMIDE AND ALBUTEROL SULFATE 3 MILLILITER(S): 2.5; .5 SOLUTION RESPIRATORY (INHALATION) at 05:37

## 2024-12-09 RX ADMIN — IPRATROPIUM BROMIDE AND ALBUTEROL SULFATE 3 MILLILITER(S): 2.5; .5 SOLUTION RESPIRATORY (INHALATION) at 12:43

## 2024-12-09 RX ADMIN — IPRATROPIUM BROMIDE AND ALBUTEROL SULFATE 3 MILLILITER(S): 2.5; .5 SOLUTION RESPIRATORY (INHALATION) at 00:18

## 2024-12-09 RX ADMIN — MEROPENEM 100 MILLIGRAM(S): 500 INJECTION, POWDER, FOR SOLUTION INTRAVENOUS at 18:54

## 2024-12-09 RX ADMIN — PIPERACILLIN SODIUM AND TAZOBACTAM SODIUM 25 GRAM(S): 4; .5 INJECTION, POWDER, LYOPHILIZED, FOR SOLUTION INTRAVENOUS at 17:47

## 2024-12-09 RX ADMIN — BUDESONIDE 0.5 MILLIGRAM(S): 0.25 SUSPENSION RESPIRATORY (INHALATION) at 05:36

## 2024-12-09 NOTE — SWALLOW BEDSIDE ASSESSMENT ADULT - SWALLOW EVAL: DIAGNOSIS
Orders received and chart reviewed. Per discussion with ELIDA Gonzales, pt made comfort care by family therefore ST evaluation no longer warranted.

## 2024-12-09 NOTE — DISCHARGE NOTE PROVIDER - NSDCMRMEDTOKEN_GEN_ALL_CORE_FT
acetaminophen 325 mg oral tablet: 2 tab(s) orally every 12 hours  aspirin 81 mg oral tablet, chewable: 1 tab(s) chewed once a day  Calcium 600mg+Vit D3 5mcg(200iu) Tablet: 1 tablet orally once a day  cefTRIAXone 1 g injection: 1 gram(s) intravenously once a day for 5 days  IcyHot Advanced Relief (menthol) 7.5% Patch: apply 1 patch topically to both knees once a day at 9am and remove at 9pm  Lac-Hydrin 12% topical lotion: apply topically to both lower and upper extremities 2 times a day  levothyroxine 75 mcg (0.075 mg) oral tablet: 1 tab(s) orally once a day  mirtazapine 7.5 mg oral tablet: 2 tab(s) orally once a day (at bedtime)  ondansetron 4 mg oral tablet, disintegratin tab(s) orally every 6 hours as needed  senna (sennosides) 8.6 mg oral tablet: 2 tab(s) orally once a day (at bedtime)   acetaminophen 325 mg oral tablet: 2 tab(s) orally every 12 hours  aspirin 81 mg oral tablet, chewable: 1 tab(s) chewed once a day  budesonide: Pulmicort 0.5mg inhaled q12hrs  Calcium 600mg+Vit D3 5mcg(200iu) Tablet: 1 tablet orally once a day  IcyHot Advanced Relief (menthol) 7.5% Patch: apply 1 patch topically to both knees once a day at 9am and remove at 9pm  ipratropium-albuterol 0.5 mg-2.5 mg/3 mL inhalation solution: 3 milliliter(s) inhaled every 6 hours  Lac-Hydrin 12% topical lotion: apply topically to both lower and upper extremities 2 times a day  levothyroxine 75 mcg (0.075 mg) oral tablet: 1 tab(s) orally once a day  mirtazapine 7.5 mg oral tablet: 3 tab(s) orally once a day (at bedtime)  senna (sennosides) 8.6 mg oral tablet: 2 tab(s) orally once a day (at bedtime)   acetaminophen 325 mg oral tablet: 2 tab(s) orally every 12 hours  aspirin 81 mg oral tablet, chewable: 1 tab(s) chewed once a day  budesonide: Pulmicort 0.5mg inhaled q12hrs  budesonide 0.5 mg/2 mL inhalation suspension: 2 milliliter(s) by nebulizer 2 times a day  Calcium 600mg+Vit D3 5mcg(200iu) Tablet: 1 tablet orally once a day  IcyHot Advanced Relief (menthol) 7.5% Patch: apply 1 patch topically to both knees once a day at 9am and remove at 9pm  ipratropium-albuterol 0.5 mg-2.5 mg/3 mL inhalation solution: 3 milliliter(s) inhaled every 6 hours  Lac-Hydrin 12% topical lotion: apply topically to both lower and upper extremities 2 times a day  levothyroxine 75 mcg (0.075 mg) oral tablet: 1 tab(s) orally once a day  mirtazapine 7.5 mg oral tablet: 3 tab(s) orally once a day (at bedtime)  piperacillin-tazobactam: Zosyn 3.375G in D5% 100ml IV Q12HRS infuse over 4 hrs; stop after 7days  First dose was given on 12/8  piperacillin-tazobactam 3 g-0.375 g/50 mL intravenous solution: 3.375 gram(s) intravenously 2 times a day First dose was given on 12/8am;  ivabx total of 7days; last day 12/14  senna (sennosides) 8.6 mg oral tablet: 2 tab(s) orally once a day (at bedtime)   acetaminophen 325 mg oral tablet: 2 tab(s) orally every 12 hours  aspirin 81 mg oral tablet, chewable: 1 tab(s) chewed once a day  budesonide: 0.5 milligram(s) inhaled 2 times a day Pulmicort 0.5mg inhaled q12hrs  Calcium 600mg+Vit D3 5mcg(200iu) Tablet: 1 tablet orally once a day  IcyHot Advanced Relief (menthol) 7.5% Patch: apply 1 patch topically to both knees once a day at 9am and remove at 9pm  ipratropium-albuterol 0.5 mg-2.5 mg/3 mL inhalation solution: 3 milliliter(s) inhaled every 6 hours  Lac-Hydrin 12% topical lotion: apply topically to both lower and upper extremities 2 times a day  levothyroxine 75 mcg (0.075 mg) oral tablet: 1 tab(s) orally once a day  meropenem 500 mg intravenous injection: 500 milligram(s) intravenous every 24 hours  mirtazapine 7.5 mg oral tablet: 3 tab(s) orally once a day (at bedtime)  senna (sennosides) 8.6 mg oral tablet: 2 tab(s) orally once a day (at bedtime)

## 2024-12-09 NOTE — PROGRESS NOTE ADULT - NUTRITIONAL ASSESSMENT
This patient has been assessed with a concern for Malnutrition and has been determined to have a diagnosis/diagnoses of Underweight (BMI < 19).    This patient is being managed with:   Diet Pureed-  Entered: Dec  6 2024 10:57PM

## 2024-12-09 NOTE — DISCHARGE NOTE PROVIDER - HOSPITAL COURSE
HPI:  89 yo  with PMH of PVD, dysphagia, hypothyroid, CAD, depression. dementia, FTT, on palliative, Comfort care, had viral gastroenteritis in SNF and N/V for a few days which resolved, her lab showed severe hyper natremia and ARF. She was given IVF in SNF and one dose of IV Ceftriaxone but her hyper natremia not improved. I discussed with her family and offered keep her as comfort  care but they asked for non invasive treatment to send to hospital.   (06 Dec 2024 21:28)    Hospital Course: 89-year-old female brought in by EMS from nursing home to the ED for abnormal lab results. Per lab analysis done at the facility earlier this morning patient was found to have a BUN of 121 creatinine of 3.18 and leukocytosis of 17.3, sodium 160 and an anion gap of 2190. Pt admitted for hypernatremia, metabolic acidosis,  found to have leukocytosis with UTI, and UTI.  Hypernatremia - improving, s/p->continue IV Hydration. Metabolic acidosis with AG - most likely due to lactic acidosis and HUMAIRA. Pt  started on Bicarb drip, now improving. HUMAIRA - most likely prerenal, oliguric, insert Fley cath, continue IVF, less likely  ATN. Renal US_____________________. CT A/P ___________ID following-> Leukocytosis - due to UTI. Urine cx -> ecoli and enterococcus species. Pt treat with IV Ceftriaxone switch to zosyn. On discharge will transition to ____________. Pt now  medically stable for discharge back to facility with comfort care.   Discharge/Dispo/Med rec discussed with     _____ who has medically cleared patient for discharge with follow-up as advised.    Important Medication Changes and Reason:    Active or Pending Issues Requiring Follow-up:    Advanced Directives:   [ ] Full code  [ ] DNR  [ ] Hospice    Discharge Diagnoses:  Hypernatremia  UTI  HUMAIRA        HPI:  89 yo  with PMH of PVD, dysphagia, hypothyroid, CAD, depression. dementia, FTT, on palliative, Comfort care, had viral gastroenteritis in SNF and N/V for a few days which resolved, her lab showed severe hyper natremia and ARF. She was given IVF in SNF and one dose of IV Ceftriaxone but her hyper natremia not improved. I discussed with her family and offered keep her as comfort  care but they asked for non invasive treatment to send to hospital.   (06 Dec 2024 21:28)    Hospital Course: 89-year-old female brought in by EMS from nursing home to the ED for abnormal lab results. Per lab analysis done at the facility earlier this morning patient was found to have a BUN of 121 creatinine of 3.18 and leukocytosis of 17.3, sodium 160 and an anion gap of 2190. Pt admitted for hypernatremia, metabolic acidosis,  found to have leukocytosis with UTI, and UTI.    Hypernatremia - improving, s/p->continue IV Hydration. Metabolic acidosis with AG - most likely due to lactic acidosis and HUMAIRA. Pt  started on Bicarb drip, now improving. HUMAIRA - most likely prerenal, oliguric, insert Fley cath, continue IVF, less likely  ATN. Renal US_____________________. CT A/P ___________ID following-> Leukocytosis - due to UTI. Urine cx -> ecoli and enterococcus species. Pt treat with IV Ceftriaxone switch to zosyn. On discharge will transition to ____________. Pt now  medically stable for discharge back to facility with comfort care.   Discharge/Dispo/Med rec discussed with     _____ who has medically cleared patient for discharge with follow-up as advised.    Important Medication Changes and Reason:    Active or Pending Issues Requiring Follow-up:    Advanced Directives:   [ ] Full code  [ ] DNR  [ ] Hospice    Discharge Diagnoses:  Hypernatremia  UTI  HUMAIRA        HPI:  89 yo  with PMH of PVD, dysphagia, hypothyroid, CAD, depression. dementia, FTT, on palliative, Comfort care, had viral gastroenteritis in SNF and N/V for a few days which resolved, her lab showed severe hyper natremia and ARF. She was given IVF in SNF and one dose of IV Ceftriaxone but her hyper natremia not improved. I discussed with her family and offered keep her as comfort  care but they asked for non invasive treatment to send to hospital.   (06 Dec 2024 21:28)    Hospital Course:   89-year-old female brought in by EMS from nursing home to the ED for abnormal lab results. Per lab analysis done at the facility earlier this morning patient was found to have a BUN of 121 creatinine of 3.18 and leukocytosis of 17.3, sodium 160 and an anion gap of 2190. Pt admitted for hypernatremia, metabolic acidosis,  found to have leukocytosis with UTI.    PT was seen by ID for UTI; found to have UCx growing ESBL E coli and E faecalis; treated with zosyn and switched to meropenem IV daily for total 7days till 12/15. ID rec to do Renal US vs CT scan; however family decided to move forward with comfort measures only.     Pt with HUMAIRA on CKD likely pre-renal in setting of dehydration, anemia; hypernatremia, Metabolic acidosis. s/p IVF and bicarb gtt. was seen by renal; family decided to move for comfort measures.    Pt now  medically stable for discharge back to facility with comfort care.   Discharge/Dispo/Med rec discussed with  Dr. Yoder who has medically cleared patient for discharge with follow-up as advised.      Important Medication Changes and Reason:  c/w meropenem 500 mg IV Q24H. Consider 7 days for presumed pyelonephritis.    Active or Pending Issues Requiring Follow-up:    Advanced Directives:   [ ] Full code  [X] DNR  [ ] Hospice    Discharge Diagnoses:  Hypernatremia  UTI  HUMAIRA

## 2024-12-09 NOTE — DISCHARGE NOTE PROVIDER - NSDCCPCAREPLAN_GEN_ALL_CORE_FT
PRINCIPAL DISCHARGE DIAGNOSIS  Diagnosis: Hypernatremia  Assessment and Plan of Treatment:       SECONDARY DISCHARGE DIAGNOSES  Diagnosis: Metabolic acidosis  Assessment and Plan of Treatment: Likely due to lactic acidosis and HUMAIRA. Received IVF  imporved    Diagnosis: Acute kidney injury superimposed on CKD  Assessment and Plan of Treatment: Most likely prerenal, oliguric, insert Fley cath, continue IVF, less likely  ATN. Renal US_____________________    Diagnosis: Acute UTI  Assessment and Plan of Treatment: HOME CARE INSTRUCTIONS  If you were prescribed antibiotics, take them exactly as your caregiver instructs you. Finish the medication even if you feel better after you have only taken some of the medication.  Drink enough water and fluids to keep your urine clear or pale yellow.  Avoid caffeine, tea, and carbonated beverages. They tend to irritate your bladder.  Empty your bladder often. Avoid holding urine for long periods of time.  Empty your bladder before and after sexual intercourse.  After a bowel movement, women should cleanse from front to back. Use each tissue only once.  SEEK MEDICAL CARE IF:  You have back pain.  You develop a fever.  Your symptoms do not begin to resolve within 3 days.  SEEK IMMEDIATE MEDICAL CARE IF:  You have severe back pain or lower abdominal pain.  You develop chills.  You have nausea or vomiting.  You have continued burning or discomfort with urination.       PRINCIPAL DISCHARGE DIAGNOSIS  Diagnosis: Hypernatremia  Assessment and Plan of Treatment: You were given IVF for elevated sodium.   dc to [facility] on comfort measures      SECONDARY DISCHARGE DIAGNOSES  Diagnosis: Acute kidney injury superimposed on CKD  Assessment and Plan of Treatment: Most likely prerenal, oliguric, insert Fley cath, continue IVF, less likely  ATN.    Diagnosis: Metabolic acidosis  Assessment and Plan of Treatment: Likely due to lactic acidosis and HUMAIRA. Received IVF  imporved    Diagnosis: Acute UTI  Assessment and Plan of Treatment: HOME CARE INSTRUCTIONS  If you were prescribed antibiotics, take them exactly as your caregiver instructs you. Finish the medication even if you feel better after you have only taken some of the medication.  Drink enough water and fluids to keep your urine clear or pale yellow.  Avoid caffeine, tea, and carbonated beverages. They tend to irritate your bladder.  Empty your bladder often. Avoid holding urine for long periods of time.  Empty your bladder before and after sexual intercourse.  After a bowel movement, women should cleanse from front to back. Use each tissue only once.  SEEK MEDICAL CARE IF:  You have back pain.  You develop a fever.  Your symptoms do not begin to resolve within 3 days.  SEEK IMMEDIATE MEDICAL CARE IF:  You have severe back pain or lower abdominal pain.  You develop chills.  You have nausea or vomiting.  You have continued burning or discomfort with urination.

## 2024-12-09 NOTE — DISCHARGE NOTE PROVIDER - DETAILS OF MALNUTRITION DIAGNOSIS/DIAGNOSES
This patient has been assessed with a concern for Malnutrition and was treated during this hospitalization for the following Nutrition diagnosis/diagnoses:     -  12/08/2024: Underweight (BMI < 19)

## 2024-12-09 NOTE — DISCHARGE NOTE PROVIDER - CARE PROVIDER_API CALL
Giovanni Yoder  Internal Medicine  [address] [place] [id number]  Phone: [phone]  Fax: [phone]  Follow Up Time: 1 week

## 2024-12-09 NOTE — DISCHARGE NOTE PROVIDER - NSDCFUADDAPPT_GEN_ALL_CORE_FT
APPTS ARE READY TO BE MADE: [X] YES    Best Family or Patient Contact (if needed):    Additional Information about above appointments (if needed):    1:   2:   3:     Other comments or requests:    APPTS ARE READY TO BE MADE: [X] YES    Best Family or Patient Contact (if needed):    Additional Information about above appointments (if needed):    1:   2:   3:     Other comments or requests:     Patient is being discharged to SNF. Caregiver will arrange follow up.

## 2024-12-09 NOTE — SWALLOW BEDSIDE ASSESSMENT ADULT - SLP PERTINENT HISTORY OF CURRENT PROBLEM
Pt is a 89 yo F with PMH of PVD, dysphagia, hypothyroid, CAD, depression. dementia, FTT, on palliative, Comfort care, had recent viral gastroenteritis brought in by EMS from nursing home to the ED for abnormal lab results. Per EMR, aaox0-1, unable to follow commands. ID consulted for + ua/fevers. Pulm consulted as pt found tachypneic ?aspiration chf/sepsis; 12/08 - seems slightly better, WBC decreasing. Palliative following for GOC/possible PCU bed. Renal failure, still acidotic. CXR - no focal consolidations. *Swallow Hx: new to this service. Team placed pt on pureed diet.

## 2024-12-10 VITALS
HEART RATE: 94 BPM | OXYGEN SATURATION: 96 % | SYSTOLIC BLOOD PRESSURE: 133 MMHG | RESPIRATION RATE: 18 BRPM | TEMPERATURE: 98 F | DIASTOLIC BLOOD PRESSURE: 80 MMHG

## 2024-12-10 LAB
-  AMPICILLIN: SIGNIFICANT CHANGE UP
-  CIPROFLOXACIN: SIGNIFICANT CHANGE UP
-  LEVOFLOXACIN: SIGNIFICANT CHANGE UP
-  NITROFURANTOIN: SIGNIFICANT CHANGE UP
-  TETRACYCLINE: SIGNIFICANT CHANGE UP
-  VANCOMYCIN: SIGNIFICANT CHANGE UP
ANION GAP SERPL CALC-SCNC: 27 MMOL/L — HIGH (ref 5–17)
BUN SERPL-MCNC: 61 MG/DL — HIGH (ref 7–23)
CALCIUM SERPL-MCNC: 7.1 MG/DL — LOW (ref 8.4–10.5)
CHLORIDE SERPL-SCNC: 119 MMOL/L — HIGH (ref 96–108)
CO2 SERPL-SCNC: 14 MMOL/L — LOW (ref 22–31)
CREAT SERPL-MCNC: 2.25 MG/DL — HIGH (ref 0.5–1.3)
CULTURE RESULTS: ABNORMAL
EGFR: 20 ML/MIN/1.73M2 — LOW
GLUCOSE SERPL-MCNC: 37 MG/DL — CRITICAL LOW (ref 70–99)
METHOD TYPE: SIGNIFICANT CHANGE UP
ORGANISM # SPEC MICROSCOPIC CNT: ABNORMAL
POTASSIUM SERPL-MCNC: 4.2 MMOL/L — SIGNIFICANT CHANGE UP (ref 3.5–5.3)
POTASSIUM SERPL-SCNC: 4.2 MMOL/L — SIGNIFICANT CHANGE UP (ref 3.5–5.3)
SODIUM SERPL-SCNC: 160 MMOL/L — CRITICAL HIGH (ref 135–145)
SPECIMEN SOURCE: SIGNIFICANT CHANGE UP

## 2024-12-10 PROCEDURE — 84540 ASSAY OF URINE/UREA-N: CPT

## 2024-12-10 PROCEDURE — 85018 HEMOGLOBIN: CPT

## 2024-12-10 PROCEDURE — 84295 ASSAY OF SERUM SODIUM: CPT

## 2024-12-10 PROCEDURE — 87086 URINE CULTURE/COLONY COUNT: CPT

## 2024-12-10 PROCEDURE — 80053 COMPREHEN METABOLIC PANEL: CPT

## 2024-12-10 PROCEDURE — 82803 BLOOD GASES ANY COMBINATION: CPT

## 2024-12-10 PROCEDURE — 84484 ASSAY OF TROPONIN QUANT: CPT

## 2024-12-10 PROCEDURE — 85014 HEMATOCRIT: CPT

## 2024-12-10 PROCEDURE — 82330 ASSAY OF CALCIUM: CPT

## 2024-12-10 PROCEDURE — 83935 ASSAY OF URINE OSMOLALITY: CPT

## 2024-12-10 PROCEDURE — 84300 ASSAY OF URINE SODIUM: CPT

## 2024-12-10 PROCEDURE — 82947 ASSAY GLUCOSE BLOOD QUANT: CPT

## 2024-12-10 PROCEDURE — 82435 ASSAY OF BLOOD CHLORIDE: CPT

## 2024-12-10 PROCEDURE — 84133 ASSAY OF URINE POTASSIUM: CPT

## 2024-12-10 PROCEDURE — 84132 ASSAY OF SERUM POTASSIUM: CPT

## 2024-12-10 PROCEDURE — 85610 PROTHROMBIN TIME: CPT

## 2024-12-10 PROCEDURE — 99232 SBSQ HOSP IP/OBS MODERATE 35: CPT

## 2024-12-10 PROCEDURE — 36415 COLL VENOUS BLD VENIPUNCTURE: CPT

## 2024-12-10 PROCEDURE — 87637 SARSCOV2&INF A&B&RSV AMP PRB: CPT

## 2024-12-10 PROCEDURE — 96360 HYDRATION IV INFUSION INIT: CPT

## 2024-12-10 PROCEDURE — 85730 THROMBOPLASTIN TIME PARTIAL: CPT

## 2024-12-10 PROCEDURE — 93005 ELECTROCARDIOGRAM TRACING: CPT

## 2024-12-10 PROCEDURE — 87077 CULTURE AEROBIC IDENTIFY: CPT

## 2024-12-10 PROCEDURE — 83605 ASSAY OF LACTIC ACID: CPT

## 2024-12-10 PROCEDURE — 80048 BASIC METABOLIC PNL TOTAL CA: CPT

## 2024-12-10 PROCEDURE — 85027 COMPLETE CBC AUTOMATED: CPT

## 2024-12-10 PROCEDURE — 83880 ASSAY OF NATRIURETIC PEPTIDE: CPT

## 2024-12-10 PROCEDURE — 87186 SC STD MICRODIL/AGAR DIL: CPT

## 2024-12-10 PROCEDURE — 71045 X-RAY EXAM CHEST 1 VIEW: CPT

## 2024-12-10 PROCEDURE — 87040 BLOOD CULTURE FOR BACTERIA: CPT

## 2024-12-10 PROCEDURE — 85025 COMPLETE CBC W/AUTO DIFF WBC: CPT

## 2024-12-10 PROCEDURE — 82570 ASSAY OF URINE CREATININE: CPT

## 2024-12-10 PROCEDURE — 94640 AIRWAY INHALATION TREATMENT: CPT

## 2024-12-10 PROCEDURE — 84156 ASSAY OF PROTEIN URINE: CPT

## 2024-12-10 PROCEDURE — 81001 URINALYSIS AUTO W/SCOPE: CPT

## 2024-12-10 PROCEDURE — 99285 EMERGENCY DEPT VISIT HI MDM: CPT | Mod: 25

## 2024-12-10 RX ORDER — LEVOTHYROXINE SODIUM 150 MCG
60 TABLET ORAL
Refills: 0 | Status: DISCONTINUED | OUTPATIENT
Start: 2024-12-10 | End: 2024-12-10

## 2024-12-10 RX ORDER — LEVOTHYROXINE SODIUM 150 MCG
60 TABLET ORAL AT BEDTIME
Refills: 0 | Status: DISCONTINUED | OUTPATIENT
Start: 2024-12-10 | End: 2024-12-10

## 2024-12-10 RX ORDER — MEROPENEM 500 MG/1
500 INJECTION, POWDER, FOR SOLUTION INTRAVENOUS
Qty: 0 | Refills: 0 | DISCHARGE
Start: 2024-12-10

## 2024-12-10 RX ADMIN — BUDESONIDE 0.5 MILLIGRAM(S): 0.25 SUSPENSION RESPIRATORY (INHALATION) at 05:10

## 2024-12-10 RX ADMIN — IPRATROPIUM BROMIDE AND ALBUTEROL SULFATE 3 MILLILITER(S): 2.5; .5 SOLUTION RESPIRATORY (INHALATION) at 00:39

## 2024-12-10 RX ADMIN — IPRATROPIUM BROMIDE AND ALBUTEROL SULFATE 3 MILLILITER(S): 2.5; .5 SOLUTION RESPIRATORY (INHALATION) at 11:41

## 2024-12-10 RX ADMIN — IPRATROPIUM BROMIDE AND ALBUTEROL SULFATE 3 MILLILITER(S): 2.5; .5 SOLUTION RESPIRATORY (INHALATION) at 05:10

## 2024-12-10 RX ADMIN — Medication 5000 UNIT(S): at 05:10

## 2024-12-10 NOTE — PROGRESS NOTE ADULT - PROVIDER SPECIALTY LIST ADULT
Pulmonology
Internal Medicine
Nephrology
Pulmonology
Infectious Disease
Pulmonology
Infectious Disease

## 2024-12-10 NOTE — DISCHARGE NOTE NURSING/CASE MANAGEMENT/SOCIAL WORK - NSDCPEFALRISK_GEN_ALL_CORE
For information on Fall & Injury Prevention, visit: [email or web address] OR  [email or web address] OR  https://www.cdc.gov/steadi/patient.html

## 2024-12-10 NOTE — PROGRESS NOTE ADULT - ASSESSMENT
89 yo  with PMH of PVD, dysphagia, hypothyroid, CAD, depression. dementia, FTT, on palliative, Comfort care, had viral gastroenteritis in SNF and N/V for a few days which resolved, her lab showed severe hyper natremia and ARF. She was given IVF in SNF and one dose of IV Ceftriaxone but her hyper natremia not improved. I dscussed with her family and offered keep her as comfort  care but they asked for non invasive treatment to send to hospital.   (06 Dec 2024 21:28)  now pulm called as pt became wheezy tachynoeic and a little sob hence was ann marie to hospital:  pt isnon verbal : alert and awke   she is on room air at this time:       Wheezing+/ Mild SOB   Renal failure  Hypothyroidism :   dementia   PVD      Wheezing+/ Mild SOB   -she was stated on hco3 drip last night as she was very acidotic: ;  -renal following:   -cont BD   ATC  -start pulmicort:   -hold onto on steroids for now:   -her WBC is high ; she is on empiric antibiotics : bne careful as zosyn has na load      : she seems slightly better;   not much of wheezing today  ;  cont current rx:   wbc decreasing      : seems to have resolved:   now pt is comfort care:   will be transferred to facility again today     Renal failure  -still acidotic:  -renal followin/8:  sign improvement in met acidosis:  defer to renal  ;   : remains in renal failure:   no ph today  : for transfer back to facility  comfort care now:     Hypothyroidism :   -cont  levothyroxine    dementia   -supportive care    PVD  -per pr imary team :     pt is DNR and dni:   now comfort care    dw acp 
89-yo F w/ PMH of PVD, CAD, dementia, and FTT, previously on comfort care, sent in for electrolyte derangements, found to have leukocytosis, fever, and bacteriuria. No BCx positivity but febrile to 101.1. WBC improving on antibiotics. Grossly unclear infection status. Would recommend imaging to assess for infectious foci. UCx growing ESBL E coli and E faecalis.    SW/CM note reviewed. Plan to be discharged to hospice with IV antibiotics to complete via PIV. If the plan per GOC is to complete IV antibiotics, would switch to meropenem to cover resistant organism.    Cultures:  12/6 UCx ESBL E coli, E faecalis  12/6 BCx NGTD    Antimicrobials:  Ceftriaxone 12/6  Zosyn 12/7-    #Bacteriuria  #Fever  #Electrolyte derangement  #FTT  #Debility  #At risk for aspiration    Recommendations:  - D/c Zosyn and start meropenem 500 mg IV Q24H. Consider 7 days for presumed pyelonephritis.  - Renal US vs CT scan if in line with GOC.   - F/u VS and WBC    Plan discussed with primary team ACP.  Thank you for this consult.      Margarito Oliver MD, PhD  Attending Physician  Division of Infectious Diseases  Department of Medicine    Please contact through MS Teams message.  Office: [phone] (after 5 PM or weekend).  
89-yo F w/ PMH of PVD, CAD, dementia, and FTT, previously on comfort care, sent in for electrolyte derangements, found to have leukocytosis, fever, and bacteriuria. No BCx positivity but febrile to 101.1. WBC improving on antibiotics. Grossly unclear infection status. Would recommend imaging to assess for infectious foci. UCx growing ESBL E coli and E faecalis.    SW/CM note reviewed. Plan to be discharged to hospice with IV antibiotics to complete via PIV. If the plan per GOC is to complete IV antibiotics, would switch to meropenem to cover resistant organism.    Cultures:  12/6 UCx ESBL E coli, E faecalis  12/6 BCx NGTD    Antimicrobials:  Ceftriaxone 12/6  Zosyn 12/7-9  Meropenem 12/9-    #Bacteriuria  #Fever  #Electrolyte derangement  #FTT  #Debility  #At risk for aspiration    Recommendations:  - Continue with meropenem 500 mg IV Q24H. Consider 7 days for presumed pyelonephritis.  - Renal US vs CT scan if in line with GOC.   - F/u VS and WBC    Plan discussed with primary team ACP.  Thank you for this consult.      Margarito Oliver MD, PhD  Attending Physician  Division of Infectious Diseases  Department of Medicine    Please contact through MS Teams message.  Office: [phone] (after 5 PM or weekend).        
89 yo  with PMH of PVD, dysphagia, hypothyroid, CAD, depression. dementia, FTT, on palliative, Comfort care, had viral gastroenteritis in SNF and N/V for a few days which resolved, her lab showed severe hyper natremia and ARF. She was given IVF in SNF and one dose of IV Ceftriaxone but her hyper natremia not improved. I dscussed with her family and offered keep her as comfort  care but they asked for non invasive treatment to send to hospital.   (06 Dec 2024 21:28)  now pulm called as pt became wheezy tachynoeic and a little sob hence was ann marie to hospital:  pt isnon verbal : alert and awke   she is on room air at this time:       Wheezing+/ Mild SOB   Renal failure  Hypothyroidism :   dementia   PVD      Wheezing+/ Mild SOB   -she was stated on hco3 drip last night as she was very acidotic: ;  -renal following:   -cont BD   ATC  -start pulmicort:   -hold onto on steroids for now:   -her WBC is high ; she is on empiric antibiotics : bne careful as zosyn has na load      : she seems slightly better;   not much of wheezing today  ;  cont current rx:   wbc decreasing      : seems to have resolved:   now pt is comfort care:   will be transferred to facility again today     12/10: pt seems comfortable:   no resp distress:  on room air:   cont current rx:     Renal failure  -still acidotic:  -renal followin/8:  sign improvement in met acidosis:  defer to renal  ;   : remains in renal failure:   no ph today  : for transfer back to facility  comfort care now:     Hypothyroidism :   -cont  levothyroxine    dementia   -supportive care    PVD  -per pr imary team :     pt is DNR and dni:   now comfort care    dw acp  for dc today
89 yo  with PMH of PVD, dysphagia, hypothyroid, CAD, depression. dementia, FTT, on palliative, Comfort care, had viral gastroenteritis in SNF and N/V for a few days which resolved, her lab showed severe hyper natremia and ARF. She was given IVF in SNF and one dose of IV Ceftriaxone but her hyper natremia not improved. I dscussed with her family and offered keep her as comfort  care but they asked for non invasive treatment to send to hospital.   (06 Dec 2024 21:28)  now pulm called as pt became wheezy tachynoeic and a little sob hence was ann marie to hospital:  pt isnon verbal : alert and awke   she is on room air at this time:       Wheezing+/ Mild SOB   Renal failure  Hypothyroidism :   dementia   PVD      Wheezing+/ Mild SOB   -she was stated on hco3 drip last night as she was very acidotic: ;  -renal following:   -cont BD   ATC  -start pulmicort:   -hold onto on steroids for now:   -her WBC is high ; she is on empiric antibiotics : bne careful as zosyn has na load      : she seems slightly better;   not much of wheezing today  ;  cont current rx:   wbc decreasing      Renal failure  -still acidotic:  -renal followin/8:  sign improvement in met acidosis:  defer to renal  ;     Hypothyroidism :   -cont  levothyroxine    dementia   -supportive care    PVD  -per pr imary team :     pt is DNR and dni:   not doing well : pretty acidotic:  but better today      dw acp 
F with PMH of PVD, dysphagia, hypothyroid, CAD, depression. dementia.    Viral gastroenteritis - resolved   Hypernatremia - improving, continue IV Hydration, monitor Na.   HUMAIRA - most likely prerenal, oliguric, insert Fley cath, continue IVF, less likely  ATN.  Metabolic acidosis with AG - most likely due to lactic acidosis and HUMAIRA. started on Bicarb drip. f/u with nephrologist.   Leukocytosis - due to UTI. treat with IV Meropenem, f/u U/C .     Metabolic encephalopathy - worse from baseline [facility] dementia due to HUMAIRA, UTI, hypernatremia   Dementia with depression - On Mirtazapine, f/u with psych.  Hypothyroidism - Levothyroxine 75 mcg, monitor TSH.   CAD - on ASA, off of statin due to advanced age.   PVD - on ASA.  Weight loss: due to anorexia. monitor weight and encourage po diet and supplement die  Palliative care: on comfort care, DNR, DNI, was on comfort care. if not improves in 24 h, will consider comfort care in PCU. other wise family does not want any aggressive treatment.   DVT ppx - Heparin SC, SCD. 
Pt. with HUMAIRA on CKD

## 2024-12-10 NOTE — PROVIDER CONTACT NOTE (CRITICAL VALUE NOTIFICATION) - ACTION/TREATMENT ORDERED:
ACP lissa aware of above, no interventions at this time. pt is comfort measures only.
ACP made aware. RN awaiting orders
bicarb gttp ordered/ABG ordered
ABG to be done

## 2024-12-10 NOTE — DISCHARGE NOTE NURSING/CASE MANAGEMENT/SOCIAL WORK - FINANCIAL ASSISTANCE
[facility] provides services at a reduced cost to those who are determined to be eligible through [facility]’s financial assistance program. Information regarding [facility]’s financial assistance program can be found by going to [email or web address] or by calling [phone].

## 2024-12-10 NOTE — DISCHARGE NOTE NURSING/CASE MANAGEMENT/SOCIAL WORK - PATIENT PORTAL LINK FT
You can access the FollowMyHealth Patient Portal offered by [facility] by registering at the following website: [email or web address]. By joining [employer]’s FollowMyHealth portal, you will also be able to view your health information using other applications (apps) compatible with our system.

## 2024-12-10 NOTE — CHART NOTE - NSCHARTNOTEFT_GEN_A_CORE
Medicine NP ([id number])    (10am) NP s/w pt. daughter (Whitney/HCP)  to obtain initial GOC for new symptoms; fever, wheezing. RN's unable to insert Diallo overnight.   Pt. is lethargic, non-verbal 2/2 Dementia,  Per MD pt. was DNR/I with comfort care @ facility/no invasive procedures/no feeding tubes but transferred to [facility] for Tx possible UTI, HUMAIRA on CKD,FTT  Pt. is  presently DNR/I but can have BIPAP /nebulizers if necessary. Continue labs/ IVF/Abx.   Avoid Diallo unless retaining over 300ml.(per son/Urologist) Bladder scan 78ml. Pt. incontinent urine.  If pt. status worsens avoid RRT; call MD  Palliative MD stated unable to see pt. today; will try to consult tomorrow for GOC/possible PCU bed.. Re-call Palliative if pt. status worsens overnight.  D/W Liliya Yoder & Dr. Kwon; agreed with plan
Request from Dr. Patel to facilitate patient discharge. Medication reconciliation reviewed, revised, and resolved with Dr. Patel who had medically cleared patient for discharge with follow-up as advised. Please refer to discharge note for detailed hospital course. Patient is currently stable for discharge to [facility] with comfort measures at this time.  Janet Barroso
Chart reviewed, case discussed with primary team ACP, pt to return to [facility] when bed is available. Please reconsult as needed. Can be reached by TEAMS M-F 9-5 Ting [place] Any other time please page [phone] if needed
Pt transitioned to comfort care measures. Pt placed on 1/2NS for hypernatremia. Nephrology will sign off case. Please re-consult if any changes.

## 2024-12-10 NOTE — PROGRESS NOTE ADULT - SUBJECTIVE AND OBJECTIVE BOX
Date of Service: 12-08-24 @ 09:03           CARDIOLOGY     PROGRESS  NOTE   ________________________________________________    CHIEF COMPLAINT:Patient is a 89y old  Female who presents with a chief complaint of Hypernatremia (07 Dec 2024 14:29)  non verbal  	  REVIEW OF SYSTEMS:  CONSTITUTIONAL: No fever, weight loss, or fatigue  EYES: No eye pain, visual disturbances, or discharge  ENT:  No difficulty hearing, tinnitus, vertigo; No sinus or throat pain  NECK: No pain or stiffness  RESPIRATORY: No cough, wheezing, chills or hemoptysis; No Shortness of Breath  CARDIOVASCULAR: No chest pain, palpitations, passing out, dizziness, or leg swelling  GASTROINTESTINAL: No abdominal or epigastric pain. No nausea, vomiting, or hematemesis; No diarrhea or constipation. No melena or hematochezia.  GENITOURINARY: No dysuria, frequency, hematuria, or incontinence  NEUROLOGICAL: No headaches, memory loss, loss of strength, numbness, or tremors  SKIN: No itching, burning, rashes, or lesions   LYMPH Nodes: No enlarged glands  ENDOCRINE: No heat or cold intolerance; No hair loss  MUSCULOSKELETAL: No joint pain or swelling; No muscle, back, or extremity pain  PSYCHIATRIC: No depression, anxiety, mood swings, or difficulty sleeping  HEME/LYMPH: No easy bruising, or bleeding gums  ALLERGY AND IMMUNOLOGIC: No hives or eczema	    [ ] All others negative	  [x ] Unable to obtain    PHYSICAL EXAM:  T(C): 38.4 (12-08-24 @ 08:06), Max: 38.4 (12-07-24 @ 09:16)  HR: 75 (12-08-24 @ 08:06) (75 - 108)  BP: 97/64 (12-08-24 @ 08:06) (64/41 - 111/67)  RR: 20 (12-08-24 @ 08:06) (20 - 24)  SpO2: 94% (12-08-24 @ 08:06) (94% - 98%)  Wt(kg): --  I&O's Summary    07 Dec 2024 07:01  -  08 Dec 2024 07:00  --------------------------------------------------------  IN: 1885 mL / OUT: 0 mL / NET: 1885 mL        Appearance: Normal	  HEENT:   Normal oral mucosa, PERRL, EOMI	  Lymphatic: No lymphadenopathy  Cardiovascular: Normal S1 S2, No JVD, + murmurs, No edema  Respiratory: rhonchi  Gastrointestinal:  Soft, Non-tender, + BS	  Skin: No rashes, No ecchymoses, No cyanosis	  Neurologic: can not asses  Extremities: Normal range of motion, No clubbing, cyanosis or edema  Vascular: Peripheral pulses palpable 2+ bilaterally    MEDICATIONS  (STANDING):  acetaminophen   IVPB .. 650 milliGRAM(s) IV Intermittent once  albuterol/ipratropium for Nebulization 3 milliLiter(s) Nebulizer every 6 hours  aspirin  chewable 81 milliGRAM(s) Oral daily  buDESOnide    Inhalation Suspension 0.5 milliGRAM(s) Inhalation every 12 hours  heparin   Injectable 5000 Unit(s) SubCutaneous every 12 hours  lactated ringers. 1000 milliLiter(s) (75 mL/Hr) IV Continuous <Continuous>  levothyroxine 75 MICROGram(s) Oral daily  mirtazapine 7.5 milliGRAM(s) Oral at bedtime  piperacillin/tazobactam IVPB.. 3.375 Gram(s) IV Intermittent every 12 hours  senna 2 Tablet(s) Oral at bedtime  sodium chloride 0.45%. 250 milliLiter(s) (125 mL/Hr) IV Continuous <Continuous>      TELEMETRY: 	    ECG:  	  RADIOLOGY:  OTHER: 	  	  LABS:	 	    CARDIAC MARKERS:                            7.7    12.05 )-----------( 225      ( 07 Dec 2024 07:27 )             27.0     12-07    156[H]  |  118[H]  |  109[H]  ----------------------------<  117[H]  3.7   |  17[L]  |  2.83[H]    Ca    6.8[L]      07 Dec 2024 07:27    TPro  7.6  /  Alb  2.7[L]  /  TBili  0.5  /  DBili  x   /  AST  72[H]  /  ALT  71[H]  /  AlkPhos  346[H]  12-06    proBNP:   Lipid Profile:   HgA1c:   TSH:   PT/INR - ( 06 Dec 2024 11:54 )   PT: 12.4 sec;   INR: 1.09 ratio         PTT - ( 06 Dec 2024 11:54 )  PTT:33.9 sec    Culture - Blood (12.06.24 @ 11:15)    Specimen Source: .Blood BLOOD   Culture Results:   No growth at 24 hours        Assessment and plan  ---------------------------  F with PMH of PVD, dysphagia, hypothyroid, CAD, depression. dementia.  Hypernatremia - improving, continue IV Hydration, monitor Na.   HUMAIRA - most likely prerenal, oliguric, insert Fley cath, continue IVF, less likely  ATN.  Metabolic acidosis with AG - most likely due to lactic acidosis and HUMAIRA. started on Bicarb drip. f/u with nephrologist.   Leukocytosis - due to UTI. treat with IV Ceftriaxone, f/u U/C .     Metabolic encephalopathy - worse from baseline [facility] dementia due to HUMAIRA, UTI, hypernatremia   Dementia with depression - On Mirtazapine, f/u with psych.  Hypothyroidism - Levothyroxine 75 mcg, monitor TSH.   CAD - on ASA, off of statin due to advanced age.   PVD - on ASA.  Weight loss: due to anorexia. monitor weight and encourage po diet and supplement die  Palliative care: on comfort care, DNR, DNI, was on comfort care. if not improves in 24 h, will consider comfort care in PCU. other wise family does not want any aggressive treatment.   DVT ppx - Heparin SC, SCD.   pt was found tachypneic this am ?aspiration chf / sepsis  spoke to the ACP  pt is comfort care  will acll palliative discussed with ACP to call family / health carre proxy re comfort care  check bladder scan  fever continue Zosyn , ID eval  check cbc, lytes  family wants full medical therapy    	        
Date of Service: 12-08-24 @ 15:10    Patient is a 89y old  Female who presents with a chief complaint of Renal failure    Chart reviewed, events noted. This is a "89 yo  with PMH of PVD, dysphagia, hypothyroid, CAD, depression. dementia, FTT, on palliative, Comfort care, had viral gastroenteritis in SNF and N/V for a few days which resolved, her lab showed severe hyper natremia and ARF. She was given IVF in SNF and one dose of IV Ceftriaxone but her hyper natremia not improved. I dscussed with her family and offered keep her as comfort  care but they asked for non invasive treatment to send to hospital. " (08 Dec 2024 11:01)      Any change in ROS: seems OK:   no apparent sob:  lying calmly:  demented:   gets irritable on touching!    MEDICATIONS  (STANDING):  albuterol/ipratropium for Nebulization 3 milliLiter(s) Nebulizer every 6 hours  aspirin  chewable 81 milliGRAM(s) Oral daily  buDESOnide    Inhalation Suspension 0.5 milliGRAM(s) Inhalation every 12 hours  heparin   Injectable 5000 Unit(s) SubCutaneous every 12 hours  lactated ringers. 1000 milliLiter(s) (75 mL/Hr) IV Continuous <Continuous>  levothyroxine 75 MICROGram(s) Oral daily  mirtazapine 7.5 milliGRAM(s) Oral at bedtime  piperacillin/tazobactam IVPB.. 3.375 Gram(s) IV Intermittent every 12 hours  senna 2 Tablet(s) Oral at bedtime  sodium chloride 0.45%. 250 milliLiter(s) (125 mL/Hr) IV Continuous <Continuous>    MEDICATIONS  (PRN):    Vital Signs Last 24 Hrs  T(C): 36.9 (08 Dec 2024 12:07), Max: 38.4 (08 Dec 2024 08:06)  T(F): 98.4 (08 Dec 2024 12:07), Max: 101.1 (08 Dec 2024 08:06)  HR: 83 (08 Dec 2024 12:07) (75 - 97)  BP: 91/55 (08 Dec 2024 12:07) (89/58 - 111/67)  BP(mean): --  RR: 20 (08 Dec 2024 12:07) (20 - 21)  SpO2: 94% (08 Dec 2024 12:07) (94% - 98%)    Parameters below as of 08 Dec 2024 12:07  Patient On (Oxygen Delivery Method): room air        I&O's Summary    07 Dec 2024 07:01  -  08 Dec 2024 07:00  --------------------------------------------------------  IN: 1885 mL / OUT: 0 mL / NET: 1885 mL          Physical Exam:   GENERAL: NAD, well-groomed, well-developed  HEENT: NAYELI/   Atraumatic, Normocephalic  ENMT: No tonsillar erythema, exudates, or enlargement; Moist mucous membranes, Good dentition, No lesions  NECK: Supple, No JVD, Normal thyroid  CHEST/LUNG: less wheezy today   CVS: Regular rate and rhythm; No murmurs, rubs, or gallops  GI: : Soft, Nontender, Nondistended; Bowel sounds present  NERVOUS SYSTEM:  awake and not  ind distress  EXTREMITIES: - edema  LYMPH: No lymphadenopathy noted  SKIN: No rashes or lesions  ENDOCRINOLOGY: No Thyromegaly  PSYCH: dementia    Labs:  23, 14, 13                            7.3    10.32 )-----------( 215      ( 08 Dec 2024 09:32 )             25.6                         7.7    12.05 )-----------( 225      ( 07 Dec 2024 07:27 )             27.0                         9.0    14.65 )-----------( 247      ( 06 Dec 2024 11:54 )             32.1     12-08    154[H]  |  116[H]  |  89[H]  ----------------------------<  71  3.5   |  21[L]  |  2.80[H]  12-07    156[H]  |  118[H]  |  109[H]  ----------------------------<  117[H]  3.7   |  17[L]  |  2.83[H]  12-06    156[H]  |  125[H]  |  106[H]  ----------------------------<  88  4.2   |  10[LL]  |  3.04[H]  12-06    155[H]  |  123[H]  |  113[H]  ----------------------------<  134[H]  3.5   |  12[L]  |  3.32[H]    Ca    6.4[LL]      08 Dec 2024 09:32  Ca    6.8[L]      07 Dec 2024 07:27  Ca    7.4[L]      06 Dec 2024 21:04    TPro  7.6  /  Alb  2.7[L]  /  TBili  0.5  /  DBili  x   /  AST  72[H]  /  ALT  71[H]  /  AlkPhos  346[H]  12-06    CAPILLARY BLOOD GLUCOSE              Urinalysis Basic - ( 08 Dec 2024 09:32 )    Color: x / Appearance: x / SG: x / pH: x  Gluc: 71 mg/dL / Ketone: x  / Bili: x / Urobili: x   Blood: x / Protein: x / Nitrite: x   Leuk Esterase: x / RBC: x / WBC x   Sq Epi: x / Non Sq Epi: x / Bacteria: x      Lactate, Blood: 2.2 mmol/L (12-06 @ 17:28)        RECENT CULTURES:  12-06 @ 12:50 Catheterized Catheterized                50,000 - 99,000 CFU/mL Escherichia coli  50,000 - 99,000 CFU/mL Enterococcus species    12-06 @ 11:15 .Blood BLOOD                No growth at 24 hours    12-06 @ 11:00 .Blood BLOOD                No growth at 24 hours          RESPIRATORY CULTURES:          Studies  Chest X-RAY  CT SCAN Chest   Venous Dopplers: LE:   CT Abdomen  Others              
[facility] DIVISION OF KIDNEY DISEASES AND HYPERTENSION   FOLLOW UP NOTE    --------------------------------------------------------------------------------  Chief Complaint:    24 hour events/subjective: Pt. was seen and examined today.   Pt lethargic with limited ROS.       PAST HISTORY  --------------------------------------------------------------------------------  No significant changes to PMH, PSH, FHx, SHx, unless otherwise noted    ALLERGIES & MEDICATIONS  --------------------------------------------------------------------------------  Allergies    No Known Allergies    Intolerances      Standing Inpatient Medications  aspirin  chewable 81 milliGRAM(s) Oral daily  heparin   Injectable 5000 Unit(s) SubCutaneous every 12 hours  levothyroxine 75 MICROGram(s) Oral daily  mirtazapine 7.5 milliGRAM(s) Oral at bedtime  piperacillin/tazobactam IVPB.- 3.375 Gram(s) IV Intermittent once  senna 2 Tablet(s) Oral at bedtime    PRN Inpatient Medications      REVIEW OF SYSTEMS  --------------------------------------------------------------------------------  All other systems were reviewed and are negative, except as noted.    VITALS/PHYSICAL EXAM  --------------------------------------------------------------------------------  T(C): 37.6 (12-07-24 @ 11:28), Max: 38.4 (12-07-24 @ 09:16)  HR: 108 (12-07-24 @ 09:16) (87 - 108)  BP: 100/57 (12-07-24 @ 09:16) (98/60 - 107/98)  RR: 24 (12-07-24 @ 09:16) (18 - 24)  SpO2: 97% (12-07-24 @ 09:16) (96% - 97%)  Wt(kg): --  Height (cm): 152.4 (12-06-24 @ 10:44)  Weight (kg): 43.1 (12-06-24 @ 10:44)  BMI (kg/m2): 18.6 (12-06-24 @ 10:44)  BSA (m2): 1.36 (12-06-24 @ 10:44)      12-06-24 @ 07:01  -  12-07-24 @ 07:00  --------------------------------------------------------  IN: 0 mL / OUT: 50 mL / NET: -50 mL    12-07-24 @ 07:01  -  12-07-24 @ 13:26  --------------------------------------------------------  IN: 165 mL / OUT: 0 mL / NET: 165 mL        Physical Exam:  	Gen: NAD  	HEENT: Anicteric  	Pulm: CTA B/L  	CV: S1S2+  	Abd: Soft, +BS   	Ext: No LE edema B/L  	Neuro: Awake  	Skin: Warm and dry      LABS/STUDIES  --------------------------------------------------------------------------------              7.7    12.05 >-----------<  225      [12-07-24 @ 07:27]              27.0     156  |  118  |  109  ----------------------------<  117      [12-07-24 @ 07:27]  3.7   |  17  |  2.83        Ca     6.8     [12-07-24 @ 07:27]    TPro  7.6  /  Alb  2.7  /  TBili  0.5  /  DBili  x   /  AST  72  /  ALT  71  /  AlkPhos  346  [12-06-24 @ 11:54]    PT/INR: PT 12.4 , INR 1.09       [12-06-24 @ 11:54]  PTT: 33.9       [12-06-24 @ 11:54]      Creatinine Trend:  SCr 2.83 [12-07 @ 07:27]  SCr 3.04 [12-06 @ 21:04]  SCr 3.32 [12-06 @ 11:54]    Urinalysis - [12-07-24 @ 07:27]      Color  / Appearance  / SG  / pH       Gluc 117 / Ketone   / Bili  / Urobili        Blood  / Protein  / Leuk Est  / Nitrite       RBC  / WBC  / Hyaline  / Gran  / Sq Epi  / Non Sq Epi  / Bacteria     Urine Creatinine 64      [12-07-24 @ 01:01]  Urine Protein 92      [12-07-24 @ 01:01]  Urine Sodium 52      [12-07-24 @ 01:01]  Urine Urea Nitrogen 995      [12-07-24 @ 01:01]  Urine Potassium 25      [12-07-24 @ 01:01]  Urine Osmolality 545      [12-07-24 @ 01:01]        Tacrolimus  Cyclosporine  Sirolimus  Mycophenolate  BK PCR  CMV PCR  Parvo PCR  EBV PCR
Patient is a 89y old  Female who presents with a chief complaint of Hypernatremia (09 Dec 2024 18:12)      INTERVAL HPI/OVERNIGHT EVENTS: stable, on palliative     Pain Location & Control: ok     MEDICATIONS  (STANDING):  albuterol/ipratropium for Nebulization 3 milliLiter(s) Nebulizer every 6 hours  aspirin  chewable 81 milliGRAM(s) Oral daily  buDESOnide    Inhalation Suspension 0.5 milliGRAM(s) Inhalation every 12 hours  heparin   Injectable 5000 Unit(s) SubCutaneous every 12 hours  lactated ringers. 1000 milliLiter(s) (75 mL/Hr) IV Continuous <Continuous>  levothyroxine Injectable 60 MICROGram(s) IV Push at bedtime  meropenem  IVPB 500 milliGRAM(s) IV Intermittent every 24 hours  mirtazapine 7.5 milliGRAM(s) Oral at bedtime  senna 2 Tablet(s) Oral at bedtime  sodium chloride 0.45%. 250 milliLiter(s) (125 mL/Hr) IV Continuous <Continuous>  sodium chloride 0.45%. 1000 milliLiter(s) (75 mL/Hr) IV Continuous <Continuous>  sodium chloride 0.45%. 1000 milliLiter(s) (75 mL/Hr) IV Continuous <Continuous>    MEDICATIONS  (PRN):      Allergies    No Known Allergies    Intolerances        REVIEW OF SYSTEMS:  UTO due to dementia     Vital Signs Last 24 Hrs  T(C): 36.7 (10 Dec 2024 05:05), Max: 36.7 (10 Dec 2024 05:05)  T(F): 98 (10 Dec 2024 05:05), Max: 98 (10 Dec 2024 05:05)  HR: 75 (10 Dec 2024 05:05) (60 - 75)  BP: 96/68 (10 Dec 2024 05:05) (95/61 - 96/68)  BP(mean): --  RR: 18 (10 Dec 2024 05:05) (18 - 18)  SpO2: 94% (10 Dec 2024 05:05) (94% - 95%)    Parameters below as of 10 Dec 2024 05:05  Patient On (Oxygen Delivery Method): room air        PHYSICAL EXAM:  GENERAL: NAD, cacechtic   HEAD:  Atraumatic, Normocephalic  EYES: EOMI, PERRLA, conjunctiva and sclera clear  ENMT: No tonsillar erythema, exudates, or enlargement; Moist mucous membranes, Good dentition, No lesions  NECK: Supple, No JVD, Normal thyroid  NERVOUS SYSTEM: demented, not following commands,   CHEST/LUNG: Clear to auscultation bilaterally; No rales, rhonchi, wheezing, or rubs  HEART: Regular rate and rhythm; No murmurs, rubs, or gallops  ABDOMEN: Soft, Nontender, Nondistended; Bowel sounds present  EXTREMITIES:  2+ Peripheral Pulses, No clubbing or cyanosis  LYMPH: No lymphadenopathy noted  SKIN: No rashes or lesions  INCISION:  Dressing dry and intact    LABS:                        7.7    10.84 )-----------( 231      ( 09 Dec 2024 10:00 )             26.9     09 Dec 2024 10:00    155    |  116    |  69     ----------------------------<  59     3.7     |  19     |  2.52     Ca    6.8        09 Dec 2024 10:00        Urinalysis Basic - ( 09 Dec 2024 10:00 )    Color: x / Appearance: x / SG: x / pH: x  Gluc: 59 mg/dL / Ketone: x  / Bili: x / Urobili: x   Blood: x / Protein: x / Nitrite: x   Leuk Esterase: x / RBC: x / WBC x   Sq Epi: x / Non Sq Epi: x / Bacteria: x      CAPILLARY BLOOD GLUCOSE            Cultures  Culture Results:   50,000 - 99,000 CFU/mL Escherichia coli ESBL  50,000 - 99,000 CFU/mL Enterococcus faecalis (12-06-24 @ 12:50)  Culture Results:   No growth at 72 Hours (12-06-24 @ 11:15)  Culture Results:   No growth at 72 Hours (12-06-24 @ 11:00)      RADIOLOGY & ADDITIONAL TESTS:    Imaging Personally Reviewed:  [x ] YES  [ ] NO    Consultant(s) Notes Reviewed:  [ x] YES  [ ] NO    Care Discussed with Consultants/Other Providers [x ] YES  [ ] NO
Date of Service: 12-09-24 @ 14:57    Patient is a 89y old  Female who presents with a chief complaint of Hypernatremia (09 Dec 2024 08:39)      Any change in ROS: she is doing same:   no sob:  no apparent distres     MEDICATIONS  (STANDING):  albuterol/ipratropium for Nebulization 3 milliLiter(s) Nebulizer every 6 hours  aspirin  chewable 81 milliGRAM(s) Oral daily  buDESOnide    Inhalation Suspension 0.5 milliGRAM(s) Inhalation every 12 hours  heparin   Injectable 5000 Unit(s) SubCutaneous every 12 hours  lactated ringers. 1000 milliLiter(s) (75 mL/Hr) IV Continuous <Continuous>  levothyroxine 75 MICROGram(s) Oral daily  mirtazapine 7.5 milliGRAM(s) Oral at bedtime  piperacillin/tazobactam IVPB.. 3.375 Gram(s) IV Intermittent every 12 hours  senna 2 Tablet(s) Oral at bedtime  sodium chloride 0.45%. 250 milliLiter(s) (125 mL/Hr) IV Continuous <Continuous>  sodium chloride 0.45%. 1000 milliLiter(s) (75 mL/Hr) IV Continuous <Continuous>  sodium chloride 0.45%. 1000 milliLiter(s) (75 mL/Hr) IV Continuous <Continuous>    MEDICATIONS  (PRN):    Vital Signs Last 24 Hrs  T(C): 36.6 (09 Dec 2024 12:19), Max: 37.4 (08 Dec 2024 20:21)  T(F): 97.9 (09 Dec 2024 12:19), Max: 99.4 (08 Dec 2024 20:21)  HR: 60 (09 Dec 2024 12:19) (60 - 96)  BP: 95/61 (09 Dec 2024 12:19) (94/57 - 99/61)  BP(mean): --  RR: 18 (09 Dec 2024 12:19) (18 - 20)  SpO2: 95% (09 Dec 2024 12:19) (93% - 96%)    Parameters below as of 09 Dec 2024 05:49  Patient On (Oxygen Delivery Method): room air        I&O's Summary    09 Dec 2024 07:01  -  09 Dec 2024 14:57  --------------------------------------------------------  IN: 0 mL / OUT: 0 mL / NET: 0 mL          Physical Exam:   GENERAL: NAD, well-groomed, well-developed  HEENT: NAYELI/   Atraumatic, Normocephalic  ENMT: No tonsillar erythema, exudates, or enlargement; Moist mucous membranes, Good dentition, No lesions  NECK: Supple, No JVD, Normal thyroid  CHEST/LUNG: Clear to auscultaion- no wheezing  CVS: Regular rate and rhythm; No murmurs, rubs, or gallops  GI: : Soft, Nontender, Nondistended; Bowel sounds present  NERVOUS SYSTEM:  awake:  do nto respond to questions:  demented   EXTREMITIES:  2+ Peripheral Pulses, No clubbing, cyanosis, or edema  LYMPH: No lymphadenopathy noted  SKIN: No rashes or lesions  ENDOCRINOLOGY: No Thyromegaly  PSYCH: demented     Labs:  24, 23, 14, 13                            7.7    10.84 )-----------( 231      ( 09 Dec 2024 10:00 )             26.9                         7.3    10.32 )-----------( 215      ( 08 Dec 2024 09:32 )             25.6                         7.7    12.05 )-----------( 225      ( 07 Dec 2024 07:27 )             27.0                         9.0    14.65 )-----------( 247      ( 06 Dec 2024 11:54 )             32.1     12-09    155[H]  |  116[H]  |  69[H]  ----------------------------<  59[L]  3.7   |  19[L]  |  2.52[H]  12-08    154[H]  |  117[H]  |  86[H]  ----------------------------<  67[L]  3.9   |  20[L]  |  2.83[H]  12-08    154[H]  |  116[H]  |  89[H]  ----------------------------<  71  3.5   |  21[L]  |  2.80[H]  12-07    156[H]  |  118[H]  |  109[H]  ----------------------------<  117[H]  3.7   |  17[L]  |  2.83[H]  12-06    156[H]  |  125[H]  |  106[H]  ----------------------------<  88  4.2   |  10[LL]  |  3.04[H]  12-06    155[H]  |  123[H]  |  113[H]  ----------------------------<  134[H]  3.5   |  12[L]  |  3.32[H]    Ca    6.8[L]      09 Dec 2024 10:00  Ca    6.8[L]      08 Dec 2024 15:56  Ca    6.4[LL]      08 Dec 2024 09:32    TPro  7.6  /  Alb  2.7[L]  /  TBili  0.5  /  DBili  x   /  AST  72[H]  /  ALT  71[H]  /  AlkPhos  346[H]  12-06    CAPILLARY BLOOD GLUCOSE              Urinalysis Basic - ( 09 Dec 2024 10:00 )    Color: x / Appearance: x / SG: x / pH: x  Gluc: 59 mg/dL / Ketone: x  / Bili: x / Urobili: x   Blood: x / Protein: x / Nitrite: x   Leuk Esterase: x / RBC: x / WBC x   Sq Epi: x / Non Sq Epi: x / Bacteria: x      Lactate, Blood: 2.2 mmol/L (12-06 @ 17:28)        RECENT CULTURES:  12-06 @ 12:50 Catheterized Catheterized   AVINASH      Escherichia coli ESBL  Escherichia coli ESBL     50,000 - 99,000 CFU/mL Escherichia coli  50,000 - 99,000 CFU/mL Enterococcus species    12-06 @ 11:15 .Blood BLOOD            rad< from: Xray Chest 1 View- PORTABLE-Urgent (12.07.24 @ 11:24) >    FINDINGS:  The right lung apex is obscured by the patient's chin.  The heart is normal in size.  No focal consolidations.  There is no pneumothorax or pleural effusion.  No acute osseous abnormalities.  Elevated right hemidiaphragm.    IMPRESSION:  No focal consolidations.    --- End of Report ---          BEATRIZ MADSEN DO; Resident Radiologist  This document has been electronically signed.  MARITZA SHARMA MD; Attending Radiologist  Thisdocument has been electronically signed. Dec  7 2024 12:24PM    < end of copied text >      No growth at 48 Hours    12-06 @ 11:00 .Blood BLOOD                No growth at 48 Hours          RESPIRATORY CULTURES:          Studies  Chest X-RAY  CT SCAN Chest   Venous Dopplers: LE:   CT Abdomen  Others              
  Follow Up:    Bacteriuria    Interval History/ROS:  Tmax 99.4. UCx w/ E faecalis and ESBL E coli. Patient was seen and examined at bedside. Awake, nonverbal. ROS unable to assess.    Allergies  No Known Allergies        ANTIMICROBIALS:  piperacillin/tazobactam IVPB.. 3.375 every 12 hours      OTHER MEDS:  MEDICATIONS  (STANDING):  albuterol/ipratropium for Nebulization 3 every 6 hours  aspirin  chewable 81 daily  buDESOnide    Inhalation Suspension 0.5 every 12 hours  heparin   Injectable 5000 every 12 hours  levothyroxine 75 daily  mirtazapine 7.5 at bedtime  senna 2 at bedtime      Vital Signs Last 24 Hrs  T(C): 36.6 (09 Dec 2024 12:19), Max: 37.4 (08 Dec 2024 20:21)  T(F): 97.9 (09 Dec 2024 12:19), Max: 99.4 (08 Dec 2024 20:21)  HR: 60 (09 Dec 2024 12:19) (60 - 96)  BP: 95/61 (09 Dec 2024 12:19) (95/61 - 99/61)  BP(mean): --  RR: 18 (09 Dec 2024 12:19) (18 - 20)  SpO2: 95% (09 Dec 2024 12:19) (93% - 96%)    Parameters below as of 09 Dec 2024 05:49  Patient On (Oxygen Delivery Method): room air        PHYSICAL EXAM:  Constitutional: nonverbal  Cardiovascular:   normal S1, S2, no murmur, RRR  Respiratory:  clear BS bilaterally, no wheezes, no rales  GI:  soft  Neurologic: awake                                7.7    10.84 )-----------( 231      ( 09 Dec 2024 10:00 )             26.9       12-09    155[H]  |  116[H]  |  69[H]  ----------------------------<  59[L]  3.7   |  19[L]  |  2.52[H]    Ca    6.8[L]      09 Dec 2024 10:00        Urinalysis Basic - ( 09 Dec 2024 10:00 )    Color: x / Appearance: x / SG: x / pH: x  Gluc: 59 mg/dL / Ketone: x  / Bili: x / Urobili: x   Blood: x / Protein: x / Nitrite: x   Leuk Esterase: x / RBC: x / WBC x   Sq Epi: x / Non Sq Epi: x / Bacteria: x        MICROBIOLOGY:  v  Catheterized Catheterized  12-06-24   50,000 - 99,000 CFU/mL Escherichia coli  50,000 - 99,000 CFU/mL Enterococcus species  --  Escherichia coli ESBL      .Blood BLOOD  12-06-24   No growth at 72 Hours  --  --      .Blood BLOOD  12-06-24   No growth at 72 Hours  --  --                RADIOLOGY:  Imaging below independently reviewed.  < from: Xray Chest 1 View- PORTABLE-Urgent (12.07.24 @ 11:24) >    ACC: [id number] EXAM:  XR CHEST PORTABLE URGENT 1V   ORDERED BY:  GARETH WEBSTER     PROCEDURE DATE:  12/07/2024          INTERPRETATION:  EXAMINATION: XR CHEST URGENT    CLINICAL INDICATION: Sepsis    TECHNIQUE: Single frontal, portable view of thechest was obtained.    COMPARISON: Chest x-ray 3/28/2019    FINDINGS:  The right lung apex is obscured by the patient's chin.  The heart is normal in size.  No focal consolidations.  There is no pneumothorax or pleural effusion.  No acute osseous abnormalities.  Elevated right hemidiaphragm.    IMPRESSION:  No focal consolidations.    --- End of Report ---          BEATRIZ MADSEN DO; Resident Radiologist  This document has been electronically signed.  MARITZA SHARMA MD; Attending Radiologist  Thisdocument has been electronically signed. Dec  7 2024 12:24PM    < end of copied text >  
Date of Service: 12-07-24 @ 10:32           CARDIOLOGY     PROGRESS  NOTE   ________________________________________________    CHIEF COMPLAINT:Patient is a 89y old  Female who presents with a chief complaint of Hypernatremia (06 Dec 2024 21:28)  pt tachypneic this am RR  of 30 per minute  	  REVIEW OF SYSTEMS:  CONSTITUTIONAL: No fever, weight loss, or fatigue  EYES: No eye pain, visual disturbances, or discharge  ENT:  No difficulty hearing, tinnitus, vertigo; No sinus or throat pain  NECK: No pain or stiffness  RESPIRATORY: No cough, wheezing, chills or hemoptysis; No Shortness of Breath  CARDIOVASCULAR: No chest pain, palpitations, passing out, dizziness, or leg swelling  GASTROINTESTINAL: No abdominal or epigastric pain. No nausea, vomiting, or hematemesis; No diarrhea or constipation. No melena or hematochezia.  GENITOURINARY: No dysuria, frequency, hematuria, or incontinence  NEUROLOGICAL: No headaches, memory loss, loss of strength, numbness, or tremors  SKIN: No itching, burning, rashes, or lesions   LYMPH Nodes: No enlarged glands  ENDOCRINE: No heat or cold intolerance; No hair loss  MUSCULOSKELETAL: No joint pain or swelling; No muscle, back, or extremity pain  PSYCHIATRIC: No depression, anxiety, mood swings, or difficulty sleeping  HEME/LYMPH: No easy bruising, or bleeding gums  ALLERGY AND IMMUNOLOGIC: No hives or eczema	    [ ] All others negative	  [x ] Unable to obtain    PHYSICAL EXAM:  T(C): 38.4 (12-07-24 @ 09:16), Max: 38.4 (12-07-24 @ 09:16)  HR: 108 (12-07-24 @ 09:16) (87 - 114)  BP: 100/57 (12-07-24 @ 09:16) (96/60 - 107/98)  RR: 24 (12-07-24 @ 09:16) (18 - 24)  SpO2: 97% (12-07-24 @ 09:16) (95% - 97%)  Wt(kg): --  I&O's Summary    06 Dec 2024 07:01  -  07 Dec 2024 07:00  --------------------------------------------------------  IN: 0 mL / OUT: 50 mL / NET: -50 mL        Appearance: Normal	  HEENT:   Normal oral mucosa, PERRL, EOMI	  Lymphatic: No lymphadenopathy  Cardiovascular: Normal S1 S2, No JVD,+murmurs, No edema  Respiratory: decrease bs  Psychiatry: non verbal  Gastrointestinal:  Soft, Non-tender, + BS	  Skin: No rashes, No ecchymoses, No cyanosis	  Extremities: Normal range of motion, No clubbing, cyanosis or edema  Vascular: Peripheral pulses palpable 2+ bilaterally    MEDICATIONS  (STANDING):  acetaminophen   IVPB .. 650 milliGRAM(s) IV Intermittent once  aspirin  chewable 81 milliGRAM(s) Oral daily  cefTRIAXone   IVPB 1000 milliGRAM(s) IV Intermittent every 24 hours  heparin   Injectable 5000 Unit(s) SubCutaneous every 12 hours  levothyroxine 75 MICROGram(s) Oral daily  mirtazapine 7.5 milliGRAM(s) Oral at bedtime  senna 2 Tablet(s) Oral at bedtime  sodium bicarbonate  Infusion 0.522 mEq/kG/Hr (150 mL/Hr) IV Continuous <Continuous>      TELEMETRY: 	    ECG:  	  RADIOLOGY:  OTHER: 	  	  LABS:	 	    CARDIAC MARKERS:                          7.7    12.05 )-----------( 225      ( 07 Dec 2024 07:27 )             27.0     12-07    156[H]  |  118[H]  |  109[H]  ----------------------------<  117[H]  3.7   |  17[L]  |  2.83[H]    Ca    6.8[L]      07 Dec 2024 07:27    TPro  7.6  /  Alb  2.7[L]  /  TBili  0.5  /  DBili  x   /  AST  72[H]  /  ALT  71[H]  /  AlkPhos  346[H]  12-06    proBNP:   Lipid Profile:   HgA1c:   TSH:   PT/INR - ( 06 Dec 2024 11:54 )   PT: 12.4 sec;   INR: 1.09 ratio         PTT - ( 06 Dec 2024 11:54 )  PTT:33.9 sec    < from: Xray Chest 1 View- PORTABLE-Urgent (Xray Chest 1 View- PORTABLE-Urgent .) (03.28.19 @ 08:28) >  INTERPRETATION:  CLINICAL INFORMATION: Elevated white blood count.    Time of Exam:  March 28, 2019 at 8:15 AM    EXAM:  Frontal Chest    FINDINGS:  Both lungs are equally aerated and free of any focal abnormalities. The   heart is not enlarged and there is no effusion.    Small hiatal hernia or is present.    COMPARISON:  None Available    Urinalysis + Microscopic Examination (12.06.24 @ 12:50)    pH Urine: 5.5   Urine Appearance: Clear   Color: Yellow   Specific Gravity: 1.020   Protein, Urine: 100 mg/dL   Glucose Qualitative, Urine: Negative   Ketone - Urine: Negative   Blood, Urine: Trace   Bilirubin: Negative   Urobilinogen: 1.0 mg/dL   Leukocyte Esterase Concentration: Small   Nitrite: Negative   Review: Reviewed   White Blood Cell - Urine: 24 /HPF   Red Blood Cell - Urine: 5 /HPF   Bacteria: Negative /HPF   Cast: 4 /LPF   Epithelial Cells: 4 /HPF   Mucus: Present        Assessment and plan  ---------------------------  F with PMH of PVD, dysphagia, hypothyroid, CAD, depression. dementia.    Viral gastroenteritis - resolved   Hypernatremia - improving, continue IV Hydration, monitor Na.   HUMAIRA - most likely prerenal, oliguric, insert Fley cath, continue IVF, less likely  ATN.  Metabolic acidosis with AG - most likely due to lactic acidosis and HUMAIRA. started on Bicarb drip. f/u with nephrologist.   Leukocytosis - due to UTI. treat with IV Ceftriaxone, f/u U/C .     Metabolic encephalopathy - worse from baseline [facility] dementia due to HUMAIRA, UTI, hypernatremia   Dementia with depression - On Mirtazapine, f/u with psych.  Hypothyroidism - Levothyroxine 75 mcg, monitor TSH.   CAD - on ASA, off of statin due to advanced age.   PVD - on ASA.  Weight loss: due to anorexia. monitor weight and encourage po diet and supplement die  Palliative care: on comfort care, DNR, DNI, was on comfort care. if not improves in 24 h, will consider comfort care in PCU. other wise family does not want any aggressive treatment.   DVT ppx - Heparin SC, SCD.   pt was found tachypneic this am ?aspiration chf / sepsis  spoke to the ACP  pt is comfort care  will acll palliative discussed with ACP to call family / health carre proxy re comfort care  check bladder scan    	        
Date of Service: 12-10-24 @ 15:32    Patient is a 89y old  Female who presents with a chief complaint of Hypernatremia (10 Dec 2024 13:49)      Any change in ROS: pt is awake:  but demented:  looks comfortable:   on room air:       MEDICATIONS  (STANDING):  albuterol/ipratropium for Nebulization 3 milliLiter(s) Nebulizer every 6 hours  aspirin  chewable 81 milliGRAM(s) Oral daily  buDESOnide    Inhalation Suspension 0.5 milliGRAM(s) Inhalation every 12 hours  heparin   Injectable 5000 Unit(s) SubCutaneous every 12 hours  lactated ringers. 1000 milliLiter(s) (75 mL/Hr) IV Continuous <Continuous>  levothyroxine Injectable 60 MICROGram(s) IV Push at bedtime  meropenem  IVPB 500 milliGRAM(s) IV Intermittent every 24 hours  mirtazapine 7.5 milliGRAM(s) Oral at bedtime  senna 2 Tablet(s) Oral at bedtime  sodium chloride 0.45%. 250 milliLiter(s) (125 mL/Hr) IV Continuous <Continuous>  sodium chloride 0.45%. 1000 milliLiter(s) (75 mL/Hr) IV Continuous <Continuous>  sodium chloride 0.45%. 1000 milliLiter(s) (75 mL/Hr) IV Continuous <Continuous>    MEDICATIONS  (PRN):    Vital Signs Last 24 Hrs  T(C): 36.4 (10 Dec 2024 11:39), Max: 36.7 (10 Dec 2024 05:05)  T(F): 97.6 (10 Dec 2024 11:39), Max: 98 (10 Dec 2024 05:05)  HR: 94 (10 Dec 2024 11:39) (75 - 94)  BP: 133/80 (10 Dec 2024 11:39) (96/68 - 133/80)  BP(mean): --  RR: 18 (10 Dec 2024 11:39) (18 - 18)  SpO2: 96% (10 Dec 2024 11:39) (94% - 96%)    Parameters below as of 10 Dec 2024 11:39  Patient On (Oxygen Delivery Method): room air        I&O's Summary    09 Dec 2024 07:01  -  10 Dec 2024 07:00  --------------------------------------------------------  IN: 50 mL / OUT: 0 mL / NET: 50 mL    10 Dec 2024 07:01  -  10 Dec 2024 15:32  --------------------------------------------------------  IN: 60 mL / OUT: 0 mL / NET: 60 mL          Physical Exam:   GENERAL: cacechtic  HEENT: NAYELI/   Atraumatic, Normocephalic  ENMT: No tonsillar erythema, exudates, or enlargement; Moist mucous membranes, Good dentition, No lesions  NECK: Supple, No JVD, Normal thyroid  CHEST/LUNG: Clear to auscultaion, ; No rales, rhonchi, wheezing, or rubs  CVS: Regular rate and rhythm; No murmurs, rubs, or gallops  GI: : Soft, Nontender, Nondistended; Bowel sounds present  NERVOUS SYSTEM:  donto respond:  no resp distress  EXTREMITIES:  -edema  LYMPH: No lymphadenopathy noted  SKIN: No rashes or lesions  ENDOCRINOLOGY: No Thyromegaly  PSYCH: demented    Labs:  24, 23, 14, 13                            7.7    10.84 )-----------( 231      ( 09 Dec 2024 10:00 )             26.9                         7.3    10.32 )-----------( 215      ( 08 Dec 2024 09:32 )             25.6                         7.7    12.05 )-----------( 225      ( 07 Dec 2024 07:27 )             27.0     12-10    160[HH]  |  119[H]  |  61[H]  ----------------------------<  37[LL]  4.2   |  14[L]  |  2.25[H]  12-09    155[H]  |  116[H]  |  69[H]  ----------------------------<  59[L]  3.7   |  19[L]  |  2.52[H]  12-08    154[H]  |  117[H]  |  86[H]  ----------------------------<  67[L]  3.9   |  20[L]  |  2.83[H]  12-08    154[H]  |  116[H]  |  89[H]  ----------------------------<  71  3.5   |  21[L]  |  2.80[H]  12-07    156[H]  |  118[H]  |  109[H]  ----------------------------<  117[H]  3.7   |  17[L]  |  2.83[H]  12-06    156[H]  |  125[H]  |  106[H]  ----------------------------<  88  4.2   |  10[LL]  |  3.04[H]    Ca    7.1[L]      10 Dec 2024 09:56  Ca    6.8[L]      09 Dec 2024 10:00  Ca    6.8[L]      08 Dec 2024 15:56      CAPILLARY BLOOD GLUCOSE      rad< from: Xray Chest 1 View- PORTABLE-Urgent (12.07.24 @ 11:24) >      INTERPRETATION:  EXAMINATION: XR CHEST URGENT    CLINICAL INDICATION: Sepsis    TECHNIQUE: Single frontal, portable view of thechest was obtained.    COMPARISON: Chest x-ray 3/28/2019    FINDINGS:  The right lung apex is obscured by the patient's chin.  The heart is normal in size.  No focal consolidations.  There is no pneumothorax or pleural effusion.  No acute osseous abnormalities.  Elevated right hemidiaphragm.    IMPRESSION:  No focal consolidations.    --- End of Report ---      < end of copied text >  < from: Xray Chest 1 View- PORTABLE-Urgent (12.07.24 @ 11:24) >      INTERPRETATION:  EXAMINATION: XR CHEST URGENT    CLINICAL INDICATION: Sepsis    TECHNIQUE: Single frontal, portable view of thechest was obtained.    COMPARISON: Chest x-ray 3/28/2019    FINDINGS:  The right lung apex is obscured by the patient's chin.  The heart is normal in size.  No focal consolidations.  There is no pneumothorax or pleural effusion.  No acute osseous abnormalities.  Elevated right hemidiaphragm.    IMPRESSION:  No focal consolidations.    --- End of Report ---      < end of copied text >          Urinalysis Basic - ( 10 Dec 2024 09:56 )    Color: x / Appearance: x / SG: x / pH: x  Gluc: 37 mg/dL / Ketone: x  / Bili: x / Urobili: x   Blood: x / Protein: x / Nitrite: x   Leuk Esterase: x / RBC: x / WBC x   Sq Epi: x / Non Sq Epi: x / Bacteria: x      Lactate, Blood: 2.2 mmol/L (12-06 @ 17:28)        RECENT CULTURES:  12-06 @ 12:50 Catheterized Catheterized   AVINASH      Escherichia coli ESBL  Enterococcus faecalis  Escherichia coli ESBL     50,000 - 99,000 CFU/mL Escherichia coli ESBL  50,000 - 99,000 CFU/mL Enterococcus faecalis    12-06 @ 11:15 .Blood BLOOD                No growth at 72 Hours    12-06 @ 11:00 .Blood BLOOD                No growth at 72 Hours          RESPIRATORY CULTURES:          Studies  Chest X-RAY  CT SCAN Chest   Venous Dopplers: LE:   CT Abdomen  Others              
  Follow Up:    Bacteriuria    Interval History/ROS:  VSS ON. Patient was seen and examined at bedside. Nonverbal. ROS unable to assess.    Allergies  No Known Allergies        ANTIMICROBIALS:  meropenem  IVPB 500 every 24 hours      OTHER MEDS:  MEDICATIONS  (STANDING):  albuterol/ipratropium for Nebulization 3 every 6 hours  aspirin  chewable 81 daily  buDESOnide    Inhalation Suspension 0.5 every 12 hours  heparin   Injectable 5000 every 12 hours  levothyroxine Injectable 60 at bedtime  mirtazapine 7.5 at bedtime  senna 2 at bedtime      Vital Signs Last 24 Hrs  T(C): 36.4 (10 Dec 2024 11:39), Max: 36.7 (10 Dec 2024 05:05)  T(F): 97.6 (10 Dec 2024 11:39), Max: 98 (10 Dec 2024 05:05)  HR: 94 (10 Dec 2024 11:39) (75 - 94)  BP: 133/80 (10 Dec 2024 11:39) (96/68 - 133/80)  BP(mean): --  RR: 18 (10 Dec 2024 11:39) (18 - 18)  SpO2: 96% (10 Dec 2024 11:39) (94% - 96%)    Parameters below as of 10 Dec 2024 11:39  Patient On (Oxygen Delivery Method): room air    PHYSICAL EXAM:  Constitutional: nonverbal  Cardiovascular:   normal S1, S2, no murmur, RRR  Respiratory:  clear BS bilaterally, no wheezes, no rales  GI:  soft  Neurologic: awake                            7.7    10.84 )-----------( 231      ( 09 Dec 2024 10:00 )             26.9       12-10    160[HH]  |  119[H]  |  61[H]  ----------------------------<  37[LL]  4.2   |  14[L]  |  2.25[H]    Ca    7.1[L]      10 Dec 2024 09:56        Urinalysis Basic - ( 10 Dec 2024 09:56 )    Color: x / Appearance: x / SG: x / pH: x  Gluc: 37 mg/dL / Ketone: x  / Bili: x / Urobili: x   Blood: x / Protein: x / Nitrite: x   Leuk Esterase: x / RBC: x / WBC x   Sq Epi: x / Non Sq Epi: x / Bacteria: x        MICROBIOLOGY:  v  Catheterized Catheterized  12-06-24   50,000 - 99,000 CFU/mL Escherichia coli ESBL  50,000 - 99,000 CFU/mL Enterococcus faecalis  --  Escherichia coli ESBL  Enterococcus faecalis      .Blood BLOOD  12-06-24   No growth at 72 Hours  --  --      .Blood BLOOD  12-06-24   No growth at 72 Hours  --  --                RADIOLOGY:  Imaging below independently reviewed.  < from: Xray Chest 1 View- PORTABLE-Urgent (12.07.24 @ 11:24) >    ACC: [id number] EXAM:  XR CHEST PORTABLE URGENT 1V   ORDERED BY:  GARETH WEBSTER     PROCEDURE DATE:  12/07/2024          INTERPRETATION:  EXAMINATION: XR CHEST URGENT    CLINICAL INDICATION: Sepsis    TECHNIQUE: Single frontal, portable view of thechest was obtained.    COMPARISON: Chest x-ray 3/28/2019    FINDINGS:  The right lung apex is obscured by the patient's chin.  The heart is normal in size.  No focal consolidations.  There is no pneumothorax or pleural effusion.  No acute osseous abnormalities.  Elevated right hemidiaphragm.    IMPRESSION:  No focal consolidations.    --- End of Report ---          BEATRIZ MADSEN DO; Resident Radiologist  This document has been electronically signed.  MARITZA SHARMA MD; Attending Radiologist  Thisdocument has been electronically signed. Dec  7 2024 12:24PM    < end of copied text >

## 2024-12-11 LAB
CULTURE RESULTS: SIGNIFICANT CHANGE UP
CULTURE RESULTS: SIGNIFICANT CHANGE UP
SPECIMEN SOURCE: SIGNIFICANT CHANGE UP
SPECIMEN SOURCE: SIGNIFICANT CHANGE UP

## 2024-12-14 LAB
CULTURE RESULTS: SIGNIFICANT CHANGE UP
SPECIMEN SOURCE: SIGNIFICANT CHANGE UP